# Patient Record
Sex: MALE | Race: OTHER | HISPANIC OR LATINO
[De-identification: names, ages, dates, MRNs, and addresses within clinical notes are randomized per-mention and may not be internally consistent; named-entity substitution may affect disease eponyms.]

---

## 2017-02-10 ENCOUNTER — APPOINTMENT (OUTPATIENT)
Dept: FAMILY MEDICINE | Facility: CLINIC | Age: 27
End: 2017-02-10

## 2017-02-10 VITALS
HEIGHT: 66 IN | OXYGEN SATURATION: 99 % | BODY MASS INDEX: 34.72 KG/M2 | TEMPERATURE: 98.7 F | HEART RATE: 70 BPM | SYSTOLIC BLOOD PRESSURE: 160 MMHG | WEIGHT: 216 LBS | DIASTOLIC BLOOD PRESSURE: 87 MMHG

## 2017-02-10 VITALS — SYSTOLIC BLOOD PRESSURE: 150 MMHG | DIASTOLIC BLOOD PRESSURE: 85 MMHG

## 2017-02-10 DIAGNOSIS — Z23 ENCOUNTER FOR IMMUNIZATION: ICD-10-CM

## 2017-02-12 LAB
25(OH)D3 SERPL-MCNC: 28 NG/ML
ALBUMIN SERPL ELPH-MCNC: 4.9 G/DL
ALP BLD-CCNC: 87 U/L
ALT SERPL-CCNC: 31 U/L
ANION GAP SERPL CALC-SCNC: 15 MMOL/L
APPEARANCE: CLEAR
AST SERPL-CCNC: 22 U/L
BACTERIA UR CULT: NORMAL
BACTERIA: NEGATIVE
BASOPHILS # BLD AUTO: 0.02 K/UL
BASOPHILS NFR BLD AUTO: 0.3 %
BILIRUB SERPL-MCNC: 0.3 MG/DL
BILIRUBIN URINE: NEGATIVE
BLOOD URINE: NEGATIVE
BUN SERPL-MCNC: 18 MG/DL
CALCIUM SERPL-MCNC: 9.8 MG/DL
CHLORIDE SERPL-SCNC: 100 MMOL/L
CHOLEST SERPL-MCNC: 164 MG/DL
CHOLEST/HDLC SERPL: 3.9 RATIO
CO2 SERPL-SCNC: 27 MMOL/L
COLOR: YELLOW
CREAT SERPL-MCNC: 1.16 MG/DL
EOSINOPHIL # BLD AUTO: 0.22 K/UL
EOSINOPHIL NFR BLD AUTO: 2.8 %
GLUCOSE QUALITATIVE U: NORMAL MG/DL
GLUCOSE SERPL-MCNC: 94 MG/DL
HBA1C MFR BLD HPLC: 6.1 %
HBV CORE IGG+IGM SER QL: NONREACTIVE
HBV SURFACE AB SER QL: REACTIVE
HBV SURFACE AG SER QL: NONREACTIVE
HCT VFR BLD CALC: 44.8 %
HCV AB SER QL: NONREACTIVE
HCV S/CO RATIO: 0.1 S/CO
HDLC SERPL-MCNC: 42 MG/DL
HGB BLD-MCNC: 14.8 G/DL
IMM GRANULOCYTES NFR BLD AUTO: 0.4 %
KETONES URINE: NEGATIVE
LDLC SERPL CALC-MCNC: 85 MG/DL
LEUKOCYTE ESTERASE URINE: NEGATIVE
LYMPHOCYTES # BLD AUTO: 2.01 K/UL
LYMPHOCYTES NFR BLD AUTO: 25.8 %
MAN DIFF?: NORMAL
MCHC RBC-ENTMCNC: 25.3 PG
MCHC RBC-ENTMCNC: 33 GM/DL
MCV RBC AUTO: 76.6 FL
MICROSCOPIC-UA: NORMAL
MONOCYTES # BLD AUTO: 0.55 K/UL
MONOCYTES NFR BLD AUTO: 7.1 %
NEUTROPHILS # BLD AUTO: 4.97 K/UL
NEUTROPHILS NFR BLD AUTO: 63.6 %
NITRITE URINE: NEGATIVE
PH URINE: 6.5
PLATELET # BLD AUTO: 176 K/UL
POTASSIUM SERPL-SCNC: 4.3 MMOL/L
PROT SERPL-MCNC: 7.6 G/DL
PROTEIN URINE: NEGATIVE MG/DL
PSA SERPL-MCNC: 1.04 NG/ML
RBC # BLD: 5.85 M/UL
RBC # FLD: 15.1 %
RED BLOOD CELLS URINE: 0 /HPF
SODIUM SERPL-SCNC: 142 MMOL/L
SPECIFIC GRAVITY URINE: 1.02
SQUAMOUS EPITHELIAL CELLS: 0 /HPF
T4 FREE SERPL-MCNC: 1.1 NG/DL
TRIGL SERPL-MCNC: 183 MG/DL
TSH SERPL-ACNC: 2.39 UIU/ML
UROBILINOGEN URINE: 1 MG/DL
WBC # FLD AUTO: 7.8 K/UL
WHITE BLOOD CELLS URINE: 0 /HPF

## 2017-03-24 ENCOUNTER — APPOINTMENT (OUTPATIENT)
Dept: FAMILY MEDICINE | Facility: CLINIC | Age: 27
End: 2017-03-24

## 2017-05-05 ENCOUNTER — APPOINTMENT (OUTPATIENT)
Dept: FAMILY MEDICINE | Facility: CLINIC | Age: 27
End: 2017-05-05

## 2018-02-09 ENCOUNTER — APPOINTMENT (OUTPATIENT)
Dept: FAMILY MEDICINE | Facility: CLINIC | Age: 28
End: 2018-02-09
Payer: COMMERCIAL

## 2018-02-09 VITALS
TEMPERATURE: 98.5 F | HEART RATE: 72 BPM | HEIGHT: 66 IN | WEIGHT: 200 LBS | BODY MASS INDEX: 32.14 KG/M2 | SYSTOLIC BLOOD PRESSURE: 154 MMHG | OXYGEN SATURATION: 97 % | DIASTOLIC BLOOD PRESSURE: 102 MMHG

## 2018-02-09 DIAGNOSIS — Z87.898 PERSONAL HISTORY OF OTHER SPECIFIED CONDITIONS: ICD-10-CM

## 2018-02-09 PROCEDURE — 99395 PREV VISIT EST AGE 18-39: CPT | Mod: 25

## 2018-02-09 PROCEDURE — 36415 COLL VENOUS BLD VENIPUNCTURE: CPT

## 2018-02-09 PROCEDURE — 93000 ELECTROCARDIOGRAM COMPLETE: CPT

## 2018-02-09 RX ORDER — AMLODIPINE BESYLATE 5 MG/1
5 TABLET ORAL DAILY
Qty: 30 | Refills: 2 | Status: DISCONTINUED | COMMUNITY
Start: 2017-02-10 | End: 2018-02-09

## 2018-02-10 LAB
25(OH)D3 SERPL-MCNC: 37.1 NG/ML
ALBUMIN SERPL ELPH-MCNC: 5.1 G/DL
ALP BLD-CCNC: 81 U/L
ALT SERPL-CCNC: 37 U/L
ANION GAP SERPL CALC-SCNC: 20 MMOL/L
APPEARANCE: CLEAR
AST SERPL-CCNC: 29 U/L
BACTERIA: NEGATIVE
BASOPHILS # BLD AUTO: 0.02 K/UL
BASOPHILS NFR BLD AUTO: 0.2 %
BILIRUB SERPL-MCNC: 0.6 MG/DL
BILIRUBIN URINE: NEGATIVE
BLOOD URINE: NEGATIVE
BUN SERPL-MCNC: 13 MG/DL
CALCIUM SERPL-MCNC: 10.1 MG/DL
CHLORIDE SERPL-SCNC: 99 MMOL/L
CHOLEST SERPL-MCNC: 161 MG/DL
CHOLEST/HDLC SERPL: 3.6 RATIO
CO2 SERPL-SCNC: 25 MMOL/L
COLOR: YELLOW
CREAT SERPL-MCNC: 1.28 MG/DL
EOSINOPHIL # BLD AUTO: 0.07 K/UL
EOSINOPHIL NFR BLD AUTO: 0.8 %
GLUCOSE QUALITATIVE U: NEGATIVE MG/DL
GLUCOSE SERPL-MCNC: 85 MG/DL
HBA1C MFR BLD HPLC: 5.8 %
HCT VFR BLD CALC: 47.7 %
HDLC SERPL-MCNC: 45 MG/DL
HGB BLD-MCNC: 15.4 G/DL
HYALINE CASTS: 12 /LPF
IMM GRANULOCYTES NFR BLD AUTO: 0.2 %
KETONES URINE: NEGATIVE
LDLC SERPL CALC-MCNC: 96 MG/DL
LEUKOCYTE ESTERASE URINE: NEGATIVE
LYMPHOCYTES # BLD AUTO: 2.16 K/UL
LYMPHOCYTES NFR BLD AUTO: 23.4 %
MAN DIFF?: NORMAL
MCHC RBC-ENTMCNC: 25 PG
MCHC RBC-ENTMCNC: 32.3 GM/DL
MCV RBC AUTO: 77.6 FL
MICROSCOPIC-UA: NORMAL
MONOCYTES # BLD AUTO: 0.7 K/UL
MONOCYTES NFR BLD AUTO: 7.6 %
NEUTROPHILS # BLD AUTO: 6.26 K/UL
NEUTROPHILS NFR BLD AUTO: 67.8 %
NITRITE URINE: NEGATIVE
PH URINE: 6
PLATELET # BLD AUTO: 243 K/UL
POTASSIUM SERPL-SCNC: 4.5 MMOL/L
PROT SERPL-MCNC: 7.7 G/DL
PROTEIN URINE: NEGATIVE MG/DL
RBC # BLD: 6.15 M/UL
RBC # FLD: 15.1 %
RED BLOOD CELLS URINE: 2 /HPF
SODIUM SERPL-SCNC: 144 MMOL/L
SPECIFIC GRAVITY URINE: 1.02
SQUAMOUS EPITHELIAL CELLS: 2 /HPF
T4 FREE SERPL-MCNC: 1.1 NG/DL
TRIGL SERPL-MCNC: 100 MG/DL
TSH SERPL-ACNC: 1.85 UIU/ML
UROBILINOGEN URINE: NEGATIVE MG/DL
WBC # FLD AUTO: 9.23 K/UL
WHITE BLOOD CELLS URINE: 2 /HPF

## 2018-03-23 ENCOUNTER — APPOINTMENT (OUTPATIENT)
Dept: FAMILY MEDICINE | Facility: CLINIC | Age: 28
End: 2018-03-23

## 2019-09-30 ENCOUNTER — NON-APPOINTMENT (OUTPATIENT)
Age: 29
End: 2019-09-30

## 2019-09-30 ENCOUNTER — APPOINTMENT (OUTPATIENT)
Dept: INTERNAL MEDICINE | Facility: CLINIC | Age: 29
End: 2019-09-30
Payer: COMMERCIAL

## 2019-09-30 VITALS
BODY MASS INDEX: 33.11 KG/M2 | TEMPERATURE: 98.8 F | HEART RATE: 92 BPM | SYSTOLIC BLOOD PRESSURE: 194 MMHG | HEIGHT: 66 IN | OXYGEN SATURATION: 96 % | DIASTOLIC BLOOD PRESSURE: 120 MMHG | WEIGHT: 206 LBS | RESPIRATION RATE: 14 BRPM

## 2019-09-30 VITALS — DIASTOLIC BLOOD PRESSURE: 110 MMHG | SYSTOLIC BLOOD PRESSURE: 180 MMHG

## 2019-09-30 DIAGNOSIS — I10 ESSENTIAL (PRIMARY) HYPERTENSION: ICD-10-CM

## 2019-09-30 DIAGNOSIS — Z78.9 OTHER SPECIFIED HEALTH STATUS: ICD-10-CM

## 2019-09-30 PROCEDURE — 99213 OFFICE O/P EST LOW 20 MIN: CPT | Mod: 25

## 2019-09-30 PROCEDURE — 93000 ELECTROCARDIOGRAM COMPLETE: CPT

## 2019-09-30 PROCEDURE — 99395 PREV VISIT EST AGE 18-39: CPT | Mod: 25

## 2019-09-30 PROCEDURE — 96127 BRIEF EMOTIONAL/BEHAV ASSMT: CPT

## 2019-09-30 PROCEDURE — 90674 CCIIV4 VAC NO PRSV 0.5 ML IM: CPT

## 2019-09-30 PROCEDURE — 36415 COLL VENOUS BLD VENIPUNCTURE: CPT

## 2019-09-30 PROCEDURE — 90471 IMMUNIZATION ADMIN: CPT

## 2019-09-30 RX ORDER — AMLODIPINE BESYLATE 5 MG/1
5 TABLET ORAL DAILY
Qty: 30 | Refills: 5 | Status: DISCONTINUED | COMMUNITY
Start: 2018-02-09 | End: 2019-09-30

## 2019-09-30 RX ORDER — BLOOD-GLUCOSE METER
KIT MISCELLANEOUS
Qty: 1 | Refills: 0 | Status: DISCONTINUED | COMMUNITY
Start: 2018-02-09 | End: 2019-09-30

## 2019-10-04 ENCOUNTER — MED ADMIN CHARGE (OUTPATIENT)
Age: 29
End: 2019-10-04

## 2019-10-04 NOTE — END OF VISIT
[FreeTextEntry3] : All medical entries made by the Scribe were at my, Dr. Jacinto Arevalo's, direction and personally dictated by me on [9/30/2019]. I have reviewed the chart and agree that the record accurately reflects my personal performance of the history, physical exam, assessment and plan. I have also personally directed, reviewed, and agreed with the chart.\par

## 2019-10-04 NOTE — HISTORY OF PRESENT ILLNESS
[FreeTextEntry1] : Annual physical [de-identified] : Patient is here for CPE. He was last seen in the office 2/2018. \par \par He presents today with c/o sporadic migraines/headaches that only present on Sundays x a couple of months. He states that at times migraines are so severe that he throws up and has to lay in bed. He notes that he works 6 days a week and has no sx during the work week. he states he only seems to get headaches Sunday when he is off from work.  He denies vision changes, arm or leg weakness.  No photophobia reported.  he states he does not have hx of headaches but states his father gets headaches. He is not taking anything for headaches.  he is currently asymptomatic\par \par He states he has not taken any BP medication in over a year.. He is asymptomatic and denies headaches(today) vision changes, SOB, chest pain, and palpations today.  \par \par He would like flu shot today and denies egg allergy. \par \par

## 2019-10-04 NOTE — PHYSICAL EXAM
[Well-Appearing] : well-appearing [No Acute Distress] : no acute distress [Normal Voice/Communication] : normal voice/communication [Normal Sclera/Conjunctiva] : normal sclera/conjunctiva [EOMI] : extraocular movements intact [PERRL] : pupils equal round and reactive to light [Normal Outer Ear/Nose] : the outer ears and nose were normal in appearance [Normal Oropharynx] : the oropharynx was normal [Normal TMs] : both tympanic membranes were normal [Normal Nasal Mucosa] : the nasal mucosa was normal [No Edema] : there was no peripheral edema [Normal] : normal rate, regular rhythm, normal S1 and S2 and no murmur heard [Soft] : abdomen soft [Non Tender] : non-tender [No Masses] : no abdominal mass palpated [Non-distended] : non-distended [No HSM] : no HSM [Normal Bowel Sounds] : normal bowel sounds [No Spinal Tenderness] : no spinal tenderness [No Joint Swelling] : no joint swelling [No Rash] : no rash [No Skin Lesions] : no skin lesions [Normal Affect] : the affect was normal [No Focal Deficits] : no focal deficits [Alert and Oriented x3] : oriented to person, place, and time [Normal Mood] : the mood was normal [Normal Insight/Judgement] : insight and judgment were intact [No Carotid Bruits] : no carotid bruits [No CVA Tenderness] : no CVA  tenderness [de-identified] : No calf tenderness [de-identified] : no abdominal bruits

## 2019-10-04 NOTE — ASSESSMENT
[FreeTextEntry1] : \par Chronic headaches/Migraines:\par -sporadic and only on Sundays- He is currently asymptomatic \par -he has not taken BP medication in over a year-uncontrolled HTN could be contributing\par -MRI of brain ordered\par -labs ordered \par -I advised him he can use OTC tylenol prn for headache\par -he is to notify office if sx persist or worsens\par -he may need to see neurology if sx persist\par \par Uncontrolled HTN:\par -EKG today showed NSR at 69, non specific T wave abnormalities-no significant change from previous EKG\par -risks of uncontrolled HTN discussed including heart attack, stroke, death, kidney disease\par -I adv low fat/low cholesterol diet and weight loss\par -I adv he  his caffeine intake\par -I advised low salt diet\par -Will start amlodipine 5mg PO daily\par -importance of f/u discussed with pt\par -If he develops severe  headaches, vision changes, chest pain and SOB he should go to ER\par -F/U 1-2 weeks for BP check\par -I send him prescription for BP machine-I advised he call me at the end of the week with BP readings. \par \par Hx of elevated liver enzymes:\par -hep B and C serologies were negative\par -will check labs today\par \par Obesity:\par -BMI 33\par -fasting labs ordered\par -weight loss encouraged\par \par Hypertriglyceridemia:\par -will check fasting labs\par \par Pre-DM:\par -Will check HgA1c\par \par Vitamin D insufficiency:\par -will check labs\par \par Abnormal EKG:\par -EKG today showed NSR at 69 - non specific T wave abnormalities \par -he is asymptomatic\par -he was refered to cardiologist in 2018 but he never went-I again advised and referred today\par \par HCM:\par \par CPE 2019 \par \par EKG 2019 \par \par Depression screening: negative (PHQ 2 score 0) 2018\par \par Flu shot: advised.  R/B discussed.  No egg allergy reported.  VIS given.  Flu shot given today 2019\par \par \par Tdap:  2016\par \par HIV testing offered: he declined testing 2019\par \par Hepatitis B: immune 2017\par \par F/U 1-2 weeks for BP check

## 2019-10-04 NOTE — HEALTH RISK ASSESSMENT
[Very Good] : ~his/her~  mood as very good [Yes] : Yes [No] : In the past 12 months have you used drugs other than those required for medical reasons? No [0] : 2) Feeling down, depressed, or hopeless: Not at all (0) [HIV test declined] : HIV test declined [Employed] : employed [] : No [de-identified] : rare [[id number]] : 0 [FreeTextEntry2] : [facility]

## 2019-10-04 NOTE — ADDENDUM
[FreeTextEntry1] : I, Rhea Carrion, acted solely as a scribe for Dr. Arevalo on this date [9/30/2019].\par

## 2019-10-05 LAB
25(OH)D3 SERPL-MCNC: 43.9 NG/ML
ALBUMIN SERPL ELPH-MCNC: 5.1 G/DL
ALP BLD-CCNC: 85 U/L
ALT SERPL-CCNC: 22 U/L
ANION GAP SERPL CALC-SCNC: 14 MMOL/L
APPEARANCE: CLEAR
AST SERPL-CCNC: 19 U/L
BACTERIA: NEGATIVE
BASOPHILS # BLD AUTO: 0.05 K/UL
BASOPHILS NFR BLD AUTO: 0.6 %
BILIRUB SERPL-MCNC: 0.3 MG/DL
BILIRUBIN URINE: NEGATIVE
BLOOD URINE: NEGATIVE
BUN SERPL-MCNC: 18 MG/DL
CALCIUM SERPL-MCNC: 9.6 MG/DL
CHLORIDE SERPL-SCNC: 104 MMOL/L
CHOLEST SERPL-MCNC: 156 MG/DL
CHOLEST/HDLC SERPL: 3.5 RATIO
CO2 SERPL-SCNC: 25 MMOL/L
COLOR: YELLOW
CREAT SERPL-MCNC: 1.41 MG/DL
CREAT SPEC-SCNC: 127 MG/DL
CREAT/PROT UR: 0.1 RATIO
EOSINOPHIL # BLD AUTO: 0.13 K/UL
EOSINOPHIL NFR BLD AUTO: 1.5 %
ERYTHROCYTE [SEDIMENTATION RATE] IN BLOOD BY WESTERGREN METHOD: 6 MM/HR
ESTIMATED AVERAGE GLUCOSE: 114 MG/DL
GLUCOSE QUALITATIVE U: NEGATIVE
GLUCOSE SERPL-MCNC: 111 MG/DL
HBA1C MFR BLD HPLC: 5.6 %
HCT VFR BLD CALC: 47.5 %
HDLC SERPL-MCNC: 45 MG/DL
HGB BLD-MCNC: 14.4 G/DL
HYALINE CASTS: 1 /LPF
IMM GRANULOCYTES NFR BLD AUTO: 0.4 %
KETONES URINE: NEGATIVE
LDLC SERPL CALC-MCNC: 82 MG/DL
LEUKOCYTE ESTERASE URINE: NEGATIVE
LYMPHOCYTES # BLD AUTO: 2.12 K/UL
LYMPHOCYTES NFR BLD AUTO: 24.9 %
MAN DIFF?: NORMAL
MCHC RBC-ENTMCNC: 24.5 PG
MCHC RBC-ENTMCNC: 30.3 GM/DL
MCV RBC AUTO: 80.9 FL
MICROSCOPIC-UA: NORMAL
MONOCYTES # BLD AUTO: 0.58 K/UL
MONOCYTES NFR BLD AUTO: 6.8 %
NEUTROPHILS # BLD AUTO: 5.62 K/UL
NEUTROPHILS NFR BLD AUTO: 65.8 %
NITRITE URINE: NEGATIVE
PH URINE: 6
PLATELET # BLD AUTO: 214 K/UL
POTASSIUM SERPL-SCNC: 4 MMOL/L
PROT SERPL-MCNC: 6.9 G/DL
PROT UR-MCNC: 14 MG/DL
PROTEIN URINE: NORMAL
RBC # BLD: 5.87 M/UL
RBC # FLD: 15.6 %
RED BLOOD CELLS URINE: 1 /HPF
SODIUM SERPL-SCNC: 143 MMOL/L
SPECIFIC GRAVITY URINE: 1.02
SQUAMOUS EPITHELIAL CELLS: 0 /HPF
T4 FREE SERPL-MCNC: 1.2 NG/DL
TRIGL SERPL-MCNC: 147 MG/DL
TSH SERPL-ACNC: 2.45 UIU/ML
UROBILINOGEN URINE: NORMAL
WBC # FLD AUTO: 8.53 K/UL
WHITE BLOOD CELLS URINE: 1 /HPF

## 2019-10-09 ENCOUNTER — APPOINTMENT (OUTPATIENT)
Dept: INTERNAL MEDICINE | Facility: CLINIC | Age: 29
End: 2019-10-09
Payer: COMMERCIAL

## 2019-10-09 VITALS
TEMPERATURE: 98.6 F | RESPIRATION RATE: 15 BRPM | OXYGEN SATURATION: 99 % | DIASTOLIC BLOOD PRESSURE: 90 MMHG | SYSTOLIC BLOOD PRESSURE: 152 MMHG | BODY MASS INDEX: 33.11 KG/M2 | WEIGHT: 206 LBS | HEART RATE: 90 BPM | HEIGHT: 66 IN

## 2019-10-09 VITALS — DIASTOLIC BLOOD PRESSURE: 112 MMHG | SYSTOLIC BLOOD PRESSURE: 160 MMHG

## 2019-10-09 DIAGNOSIS — R51 HEADACHE: ICD-10-CM

## 2019-10-09 DIAGNOSIS — R73.03 PREDIABETES.: ICD-10-CM

## 2019-10-09 DIAGNOSIS — R74.8 ABNORMAL LEVELS OF OTHER SERUM ENZYMES: ICD-10-CM

## 2019-10-09 PROCEDURE — 99214 OFFICE O/P EST MOD 30 MIN: CPT | Mod: 25

## 2019-10-09 PROCEDURE — 36415 COLL VENOUS BLD VENIPUNCTURE: CPT

## 2019-10-09 NOTE — PHYSICAL EXAM
[No Acute Distress] : no acute distress [Well-Appearing] : well-appearing [Normal Voice/Communication] : normal voice/communication [PERRL] : pupils equal round and reactive to light [Normal Sclera/Conjunctiva] : normal sclera/conjunctiva [Normal Oropharynx] : the oropharynx was normal [Normal] : normal rate, regular rhythm, normal S1 and S2 and no murmur heard [No Edema] : there was no peripheral edema [No Rash] : no rash [No Focal Deficits] : no focal deficits [Normal Affect] : the affect was normal [Normal Mood] : the mood was normal [Alert and Oriented x3] : oriented to person, place, and time [Normal Insight/Judgement] : insight and judgment were intact [de-identified] : DTR +2 UE and LE B/L except left patella tendon reflex 3+, %/% strength UE and LE B/L, normal finger to se testing B/L

## 2019-10-09 NOTE — REVIEW OF SYSTEMS
[Chills] : no chills [Fever] : no fever [Fatigue] : no fatigue [Pain] : no pain [Vision Problems] : no vision problems [Chest Pain] : no chest pain [Palpitations] : no palpitations [Shortness Of Breath] : no shortness of breath [Lower Ext Edema] : no lower extremity edema [Cough] : no cough [Wheezing] : no wheezing [Dyspnea on Exertion] : not dyspnea on exertion [Abdominal Pain] : no abdominal pain [Nausea] : no nausea [Diarrhea] : no diarrhea [Headache] : no headache [Vomiting] : no vomiting [Dizziness] : no dizziness [Unsteady Walk] : no ataxia [Negative] : Neurological

## 2019-10-09 NOTE — HISTORY OF PRESENT ILLNESS
[de-identified] : Here for follow up and BP check\par \par He states he made appt to see Neurology (Dr Mckeon)\par \par he states he has not had further headaches\par \par No new complaints

## 2019-10-09 NOTE — ASSESSMENT
[FreeTextEntry1] : \par Elevated creatinine 1.41\par -will check BMP\par -urine P:C negative 10/2019\par \par Chronic headaches/Migraines:\par -headaches have resolved\par \par Abnormal brain MRI:\par -his MRI brain shows multiple white matter lesions suggestive of multiple sclerosis\par -I discussed findings with him at length\par -appears to be asymptomatic\par -he has appt to see neurology this month\par \par Uncontrolled HTN:\par -I adv low fat/low cholesterol diet and weight loss\par -I adv he  his caffeine intake\par -I advised low salt diet\par -now on amlodipine 10mg PO daily and BP still not at goal\par -will add HCTZ 12.5mg Po daily\par -If he develops severe  headaches, vision changes, chest pain and SOB he should go to ER\par -F/U 3 weeks for BP check\par -he will continue to check BPs at home and was instructed to call me in 1 week with BP readings\par \par Abnormal EKG:\par -I again advised he see cardiology\par \par Obesity:\par -weight loss encouraged\par \par Hypertriglyceridemia:\par -lipids at goal 10/2019\par \par Vitamin D insufficiency:\par -vitamin D 25-OH was normal 10/2019\par \par \par HCM:\par \par CPE 2019 \par \par EKG 2019 \par \par Depression screening: negative (PHQ 2 score 0) 2018\par \par Flu shot:  2019\par \par Tdap:  2016\par \par HIV testing offered: he declined testing 2019\par \par Hepatitis B: immune 2017\par \par F/U 3 weeks for BP check

## 2019-10-10 LAB
ANION GAP SERPL CALC-SCNC: 18 MMOL/L
BUN SERPL-MCNC: 15 MG/DL
CALCIUM SERPL-MCNC: 10 MG/DL
CHLORIDE SERPL-SCNC: 103 MMOL/L
CO2 SERPL-SCNC: 21 MMOL/L
CREAT SERPL-MCNC: 1.28 MG/DL
GLUCOSE SERPL-MCNC: 106 MG/DL
POTASSIUM SERPL-SCNC: 3.9 MMOL/L
SODIUM SERPL-SCNC: 142 MMOL/L

## 2019-10-22 ENCOUNTER — APPOINTMENT (OUTPATIENT)
Dept: NEUROLOGY | Facility: CLINIC | Age: 29
End: 2019-10-22
Payer: COMMERCIAL

## 2019-10-22 VITALS
SYSTOLIC BLOOD PRESSURE: 170 MMHG | DIASTOLIC BLOOD PRESSURE: 108 MMHG | HEIGHT: 66 IN | BODY MASS INDEX: 33.11 KG/M2 | WEIGHT: 206 LBS

## 2019-10-22 PROCEDURE — 99204 OFFICE O/P NEW MOD 45 MIN: CPT

## 2019-10-22 NOTE — CONSULT LETTER
[Dear  ___] : Dear  [unfilled], [Consult Letter:] : I had the pleasure of evaluating your patient, [unfilled]. [Please see my note below.] : Please see my note below. [Consult Closing:] : Thank you very much for allowing me to participate in the care of this patient.  If you have any questions, please do not hesitate to contact me. [Sincerely,] : Sincerely, [FreeTextEntry3] : Dick Mckeon M.D., Ph.D. DPN-N\par [facility]\par [facility]\par Medical Director of Stroke Services\par [facility]\par

## 2019-10-22 NOTE — PHYSICAL EXAM
[General Appearance - Alert] : alert [General Appearance - In No Acute Distress] : in no acute distress [General Appearance - Well Developed] : well developed [General Appearance - Well Nourished] : well nourished [Place] : oriented to place [Person] : oriented to person [Time] : oriented to time [Short Term Intact] : short term memory intact [Span Intact] : the attention span was normal [Remote Intact] : remote memory intact [Registration Intact] : recent registration memory intact [Visual Intact] : visual attention was ~T not ~L decreased [Concentration Intact] : normal concentrating ability [Repeating Phrases] : no difficulty repeating a phrase [Naming Objects] : no difficulty naming common objects [Fluency] : fluency intact [Comprehension] : comprehension intact [Current Events] : adequate knowledge of current events [Past History] : adequate knowledge of personal past history [Cranial Nerves Oculomotor (III)] : extraocular motion intact [Cranial Nerves Optic (II)] : visual acuity intact bilaterally,  visual fields full to confrontation, pupils equal round and reactive to light [Cranial Nerves Facial (VII)] : face symmetrical [Cranial Nerves Trigeminal (V)] : facial sensation intact symmetrically [Cranial Nerves Vestibulocochlear (VIII)] : hearing was intact bilaterally [Cranial Nerves Hypoglossal (XII)] : there was no tongue deviation with protrusion [Cranial Nerves Accessory (XI - Cranial And Spinal)] : head turning and shoulder shrug symmetric [Cranial Nerves Glossopharyngeal (IX)] : tongue and palate midline [Motor Strength] : muscle strength was normal in all four extremities [No Muscle Atrophy] : normal bulk in all four extremities [Paresis Pronator Drift Right-Sided] : no pronator drift on the right [Paresis Pronator Drift Left-Sided] : no pronator drift on the left [Sensation Tactile Decrease] : light touch was intact [Sensation Pain / Temperature Decrease] : pain and temperature was intact [Sensation Vibration Decrease] : vibration was intact [Proprioception] : proprioception was intact [Balance] : balance was intact [Tremor] : no tremor present [Coordination - Dysmetria Impaired Finger-to-Nose Bilateral] : not present [2+] : Patella left 2+ [PERRL With Normal Accommodation] : pupils were equal in size, round, reactive to light, with normal accommodation [Sclera] : the sclera and conjunctiva were normal [Optic Disc Abnormality] : the optic disc were normal in size and color [No APD] : no afferent pupillary defect [Extraocular Movements] : extraocular movements were intact [Full Visual Field] : full visual field [No [place]] : no internuclear ophthalmoplegia [Papilledema Of Both Eyes] : no papilledema [Edema] : there was no peripheral edema [Disc Blurred Margins Both Eyes] : sharp margins [Abnormal Walk] : normal gait [Involuntary Movements] : no involuntary movements were seen [Motor Tone] : muscle strength and tone were normal

## 2019-10-22 NOTE — HISTORY OF PRESENT ILLNESS
[FreeTextEntry1] : Initial office visit October 22, 2019:\par This is a 29-year-old man who presents today for neurologic evaluation. He had an abnormal MRI of his brain. The MRI was done to evaluate headaches. The patient was experiencing headaches with nausea and vomiting. He was found to be hypertensive and given blood pressure medicines. When his blood pressure is better controlled the headaches resolved. However he had brain MRI done October 2, 2019 which showed white matter lesions. The radiologist suggested that the configuration of these lesions was consistent with multiple sclerosis. I don't have actual pictures of the MRI to review. He denies any previous neurologic symptoms including vision loss of one eye numbness or weakness of the limb or side of his body. He does not have any gait difficulties. He does endorse frequent urination but not incontinence. He is here today for neurologic evaluation.

## 2019-10-22 NOTE — DATA REVIEWED
[de-identified] : He brought a report of an MRI of his brain that was done October 2, 2019. The impression is multiple white matter lesions with the distribution and pattern compatible with classic demyelination and multiple sclerosis. Clinical correlation was advised.

## 2019-10-22 NOTE — ASSESSMENT
[FreeTextEntry1] : This is a 29-year-old man who presents today for evaluation and abnormal MRI that suggested the possibility of multiple sclerosis. He has no previous history of neurologic symptoms. Typically multiple sclerosis is thought to need 2 episodes are separate in time and space he is not had one. I would like to obtain an MRI of his cervical and thoracic spine as well as obtain pictures of the brain MRI for me to review. He may have a diagnosis of clinically definite multiple sclerosis based on radiology findings. If this is the case I will discuss treatment with him. I briefly touched upon different treatment strategies including injectable and oral medications for multiple sclerosis. I will call him with the results of the MRI of the cervical thoracic spine once available and I will see him back in the office and person in one month, sooner should the need arise.

## 2019-10-29 ENCOUNTER — APPOINTMENT (OUTPATIENT)
Dept: INTERNAL MEDICINE | Facility: CLINIC | Age: 29
End: 2019-10-29
Payer: COMMERCIAL

## 2019-11-18 ENCOUNTER — APPOINTMENT (OUTPATIENT)
Dept: INTERNAL MEDICINE | Facility: CLINIC | Age: 29
End: 2019-11-18
Payer: COMMERCIAL

## 2019-11-18 VITALS
DIASTOLIC BLOOD PRESSURE: 102 MMHG | OXYGEN SATURATION: 98 % | HEIGHT: 66 IN | HEART RATE: 104 BPM | SYSTOLIC BLOOD PRESSURE: 152 MMHG | RESPIRATION RATE: 15 BRPM | BODY MASS INDEX: 32.47 KG/M2 | TEMPERATURE: 98.9 F | WEIGHT: 202 LBS

## 2019-11-18 VITALS — SYSTOLIC BLOOD PRESSURE: 148 MMHG | DIASTOLIC BLOOD PRESSURE: 106 MMHG

## 2019-11-18 PROCEDURE — 99213 OFFICE O/P EST LOW 20 MIN: CPT

## 2019-11-18 NOTE — ASSESSMENT
[FreeTextEntry1] : \par Multiple sclerosis:\par -now seeing neurology-Dr Jean\par -he was referred to,ophthalmology\par -he will f/u with Neurologist to discuss medication options\par \par Uncontrolled HTN:\par -BP remains uncontrolled although home BPs seem to be better\par -I adv low fat/low cholesterol diet and weight loss\par -I adv he  his caffeine intake\par -I advised low salt diet\par -on amlodipine 10mg PO daily and HCTZ 12.5mg Po daily\par -will increase HCTZ to 25mg PO daily \par -If he develops severe  headaches, vision changes, chest pain and SOB he should go to ER\par -F/U 4 weeks for BP check\par -he will continue to check BPs at home and was instructed to call me in 1 week with BP readings-I advised him he may need third medication\par \par Abnormal EKG:\par -I have advised he see cardiology\par \par Obesity:\par -weight loss encouraged\par \par Hypertriglyceridemia:\par -lipids at goal 10/2019\par \par Vitamin D insufficiency:\par -vitamin D 25-OH was normal 10/2019\par \par \par HCM:\par \par CPE 2019 \par \par EKG 2019 \par \par Depression screening: negative (PHQ 2 score 0) 2018\par \par Flu shot:  2019\par \par Tdap:  2016\par \par HIV testing offered: he declined testing 2019\par \par Hepatitis B: immune 2017\par \par F/U 4 weeks for BP check

## 2019-11-18 NOTE — REVIEW OF SYSTEMS
[Negative] : Psychiatric [Fever] : no fever [Chills] : no chills [Fatigue] : no fatigue [Lower Ext Edema] : no lower extremity edema [Palpitations] : no palpitations [Chest Pain] : no chest pain [Shortness Of Breath] : no shortness of breath [Wheezing] : no wheezing [Dyspnea on Exertion] : not dyspnea on exertion [Cough] : no cough [Abdominal Pain] : no abdominal pain [Diarrhea] : no diarrhea [Nausea] : no nausea [Vomiting] : no vomiting

## 2019-11-18 NOTE — ADDENDUM
[FreeTextEntry1] : I, Rhea Carrion, acted solely as a scribe for Dr. Arevalo on this date [11/18/2019].\par

## 2019-11-18 NOTE — PHYSICAL EXAM
[Well-Appearing] : well-appearing [No Acute Distress] : no acute distress [Normal Voice/Communication] : normal voice/communication [Normal Sclera/Conjunctiva] : normal sclera/conjunctiva [PERRL] : pupils equal round and reactive to light [Normal Oropharynx] : the oropharynx was normal [Normal] : no respiratory distress, lungs were clear to auscultation bilaterally and no accessory muscle use [No Edema] : there was no peripheral edema [No Rash] : no rash [Normal Affect] : the affect was normal [Alert and Oriented x3] : oriented to person, place, and time [Normal Insight/Judgement] : insight and judgment were intact [Normal Mood] : the mood was normal

## 2019-11-18 NOTE — END OF VISIT
[FreeTextEntry3] : All medical entries made by the Scribe were at my, Dr. Jacinto Arevalo's, direction and personally dictated by me on [11/18/2019]. I have reviewed the chart and agree that the record accurately reflects my personal performance of the history, physical exam, assessment and plan. I have also personally directed, reviewed, and agreed with the chart.\par

## 2019-11-18 NOTE — HISTORY OF PRESENT ILLNESS
[FreeTextEntry1] : HTN follow up  [de-identified] : Here for follow up and BP check\par \par His BP is elevated today. He states that he has been compliant with medication. He also notes that his BP at home ranges from 134-140/90s. He does admit to eating [employer] here and there. \par \par He was seen by Neurologist-Dr Mckeon and dx with MS.  he also saw Dr Jean for second opion and was told he did have MS.  He was advised to see ophthalmologist and has referral.  He was told he would likely need to be started on MS medication.  He will f/u with neurology\par \par No new complaints

## 2019-11-20 ENCOUNTER — NON-APPOINTMENT (OUTPATIENT)
Age: 29
End: 2019-11-20

## 2019-11-20 ENCOUNTER — APPOINTMENT (OUTPATIENT)
Dept: CARDIOLOGY | Facility: CLINIC | Age: 29
End: 2019-11-20
Payer: COMMERCIAL

## 2019-11-20 VITALS
OXYGEN SATURATION: 98 % | WEIGHT: 203 LBS | HEART RATE: 94 BPM | HEIGHT: 66 IN | DIASTOLIC BLOOD PRESSURE: 96 MMHG | SYSTOLIC BLOOD PRESSURE: 157 MMHG | BODY MASS INDEX: 32.62 KG/M2

## 2019-11-20 VITALS — DIASTOLIC BLOOD PRESSURE: 91 MMHG | SYSTOLIC BLOOD PRESSURE: 150 MMHG

## 2019-11-20 DIAGNOSIS — Z78.9 OTHER SPECIFIED HEALTH STATUS: ICD-10-CM

## 2019-11-20 DIAGNOSIS — Z82.49 FAMILY HISTORY OF ISCHEMIC HEART DISEASE AND OTHER DISEASES OF THE CIRCULATORY SYSTEM: ICD-10-CM

## 2019-11-20 PROCEDURE — 99204 OFFICE O/P NEW MOD 45 MIN: CPT

## 2019-11-20 PROCEDURE — 93000 ELECTROCARDIOGRAM COMPLETE: CPT

## 2019-11-20 NOTE — HISTORY OF PRESENT ILLNESS
[FreeTextEntry1] : A 30 y/o male with PMH of HTN , Multiple sclerosis , referred because of abnormal eKG\par  He has no prior cardiac history . \par He has a physical job and has no exertional chest pain , SOB , palpitations , dizziness or syncope

## 2019-11-20 NOTE — ASSESSMENT
[FreeTextEntry1] : 28 y/o male with no significant PMHx, with HTN controlled on amlodipine , HCTZ \par Mildly uncontrolled\par Non specific ST-T wave changes \par will order an echo to evaluate for LV function, LVH and RWMA

## 2019-11-20 NOTE — PHYSICAL EXAM
[General Appearance - Well Developed] : well developed [Normal Appearance] : normal appearance [General Appearance - Well Nourished] : well nourished [Well Groomed] : well groomed [No Deformities] : no deformities [General Appearance - In No Acute Distress] : no acute distress [Normal Conjunctiva] : the conjunctiva exhibited no abnormalities [No Oral Pallor] : no oral pallor [Eyelids - No Xanthelasma] : the eyelids demonstrated no xanthelasmas [Normal Jugular Venous A Waves Present] : normal jugular venous A waves present [No Jugular Venous Diggs A Waves] : no jugular venous diggs A waves [Normal Jugular Venous V Waves Present] : normal jugular venous V waves present [No Oral Cyanosis] : no oral cyanosis [Heart Sounds] : normal S1 and S2 [Heart Rate And Rhythm] : heart rate and rhythm were normal [Murmurs] : no murmurs present [Respiration, Rhythm And Depth] : normal respiratory rhythm and effort [Exaggerated Use Of Accessory Muscles For Inspiration] : no accessory muscle use [Abdomen Tenderness] : non-tender [Auscultation Breath Sounds / Voice Sounds] : lungs were clear to auscultation bilaterally [Abdomen Soft] : soft [Abdomen Mass (___ Cm)] : no abdominal mass palpated [] : no hepato-splenomegaly [Cyanosis, Localized] : no localized cyanosis [Abnormal Walk] : normal gait [Nail Clubbing] : no clubbing of the fingernails [Skin Turgor] : normal skin turgor [Oriented To Time, Place, And Person] : oriented to person, place, and time [Skin Color & Pigmentation] : normal skin color and pigmentation

## 2019-11-25 ENCOUNTER — APPOINTMENT (OUTPATIENT)
Dept: NEUROLOGY | Facility: CLINIC | Age: 29
End: 2019-11-25

## 2019-11-26 ENCOUNTER — APPOINTMENT (OUTPATIENT)
Dept: CARDIOLOGY | Facility: CLINIC | Age: 29
End: 2019-11-26

## 2019-12-26 ENCOUNTER — APPOINTMENT (OUTPATIENT)
Dept: INTERNAL MEDICINE | Facility: CLINIC | Age: 29
End: 2019-12-26
Payer: COMMERCIAL

## 2019-12-26 VITALS
HEIGHT: 66 IN | BODY MASS INDEX: 32.95 KG/M2 | TEMPERATURE: 98.2 F | DIASTOLIC BLOOD PRESSURE: 83 MMHG | SYSTOLIC BLOOD PRESSURE: 145 MMHG | OXYGEN SATURATION: 99 % | HEART RATE: 95 BPM | WEIGHT: 205 LBS

## 2019-12-26 PROCEDURE — 36415 COLL VENOUS BLD VENIPUNCTURE: CPT

## 2019-12-26 PROCEDURE — 99213 OFFICE O/P EST LOW 20 MIN: CPT | Mod: 25

## 2019-12-26 PROCEDURE — 96127 BRIEF EMOTIONAL/BEHAV ASSMT: CPT

## 2019-12-26 NOTE — PHYSICAL EXAM
[Well-Appearing] : well-appearing [Normal Voice/Communication] : normal voice/communication [No Acute Distress] : no acute distress [Normal Oropharynx] : the oropharynx was normal [Normal Sclera/Conjunctiva] : normal sclera/conjunctiva [PERRL] : pupils equal round and reactive to light [No Edema] : there was no peripheral edema [Normal] : normal rate, regular rhythm, normal S1 and S2 and no murmur heard [Normal Affect] : the affect was normal [No Rash] : no rash [Normal Insight/Judgement] : insight and judgment were intact [Alert and Oriented x3] : oriented to person, place, and time [Normal Mood] : the mood was normal

## 2019-12-26 NOTE — ASSESSMENT
[FreeTextEntry1] : \par Multiple sclerosis:\par -now seeing neurology-Dr Jean\par -now on Tecfidera\par -he was seen by ophthalmology\par -he remains asymptomatic\par -will check BMP and hepatic function panel\par \par Uncontrolled HTN:\par -BP better but still not at goal\par -he states he is taking medication but feels he could do better with his diet\par -I adv low fat/low cholesterol diet and weight loss\par -I adv he  his caffeine intake\par -I advised low salt diet\par -on amlodipine 10mg PO daily and HCTZ 25mg Po daily\par -f/u in 3 months for BP check-if not at goal may need 3rd agent\par -will check BMP\par \par Abnormal EKG:\par -seen by cardiology who ordered ECHO-he missed appt for ECHO but states he will reschedule\par \par Obesity:\par -weight loss encouraged\par \par Hypertriglyceridemia:\par -lipids at goal 10/2019\par \par Vitamin D insufficiency:\par -vitamin D 25-OH was normal 10/2019\par \par \par HCM:\par \par CPE 2019 \par \par EKG 2019 \par \par Depression screening: negative (PHQ 2 score 0) 2019\par \par Flu shot:  2019\par \par Tdap:  2016\par \par HIV testing offered: he declined testing 2019\par \par Hepatitis B: immune 2017\par \par F/U 3 months for BP check

## 2019-12-26 NOTE — REVIEW OF SYSTEMS
[Negative] : Neurological [Fever] : no fever [Chills] : no chills [Fatigue] : no fatigue [Recent Change In Weight] : ~T no recent weight change [Chest Pain] : no chest pain [Palpitations] : no palpitations [Lower Ext Edema] : no lower extremity edema [Shortness Of Breath] : no shortness of breath [Wheezing] : no wheezing [Dyspnea on Exertion] : not dyspnea on exertion [Cough] : no cough [Nausea] : no nausea [Abdominal Pain] : no abdominal pain [Diarrhea] : no diarrhea [Vomiting] : no vomiting

## 2020-01-01 LAB
ALBUMIN SERPL ELPH-MCNC: 5.3 G/DL
ALP BLD-CCNC: 81 U/L
ALT SERPL-CCNC: 65 U/L
ANION GAP SERPL CALC-SCNC: 15 MMOL/L
AST SERPL-CCNC: 28 U/L
BILIRUB DIRECT SERPL-MCNC: 0.2 MG/DL
BILIRUB INDIRECT SERPL-MCNC: 0.4 MG/DL
BILIRUB SERPL-MCNC: 0.6 MG/DL
BUN SERPL-MCNC: 18 MG/DL
CALCIUM SERPL-MCNC: 10.2 MG/DL
CHLORIDE SERPL-SCNC: 99 MMOL/L
CO2 SERPL-SCNC: 27 MMOL/L
CREAT SERPL-MCNC: 1.28 MG/DL
GLUCOSE SERPL-MCNC: 87 MG/DL
POTASSIUM SERPL-SCNC: 3.7 MMOL/L
PROT SERPL-MCNC: 7.5 G/DL
SODIUM SERPL-SCNC: 141 MMOL/L

## 2020-01-02 ENCOUNTER — RESULT REVIEW (OUTPATIENT)
Age: 30
End: 2020-01-02

## 2020-05-08 ENCOUNTER — RX RENEWAL (OUTPATIENT)
Age: 30
End: 2020-05-08

## 2020-06-25 ENCOUNTER — APPOINTMENT (OUTPATIENT)
Dept: INTERNAL MEDICINE | Facility: CLINIC | Age: 30
End: 2020-06-25
Payer: COMMERCIAL

## 2020-06-25 VITALS
WEIGHT: 207 LBS | BODY MASS INDEX: 33.27 KG/M2 | HEIGHT: 66 IN | SYSTOLIC BLOOD PRESSURE: 140 MMHG | OXYGEN SATURATION: 98 % | RESPIRATION RATE: 15 BRPM | HEART RATE: 88 BPM | DIASTOLIC BLOOD PRESSURE: 94 MMHG | TEMPERATURE: 98.9 F

## 2020-06-25 DIAGNOSIS — I10 ESSENTIAL (PRIMARY) HYPERTENSION: ICD-10-CM

## 2020-06-25 PROCEDURE — 99213 OFFICE O/P EST LOW 20 MIN: CPT | Mod: 25

## 2020-06-25 PROCEDURE — 36415 COLL VENOUS BLD VENIPUNCTURE: CPT

## 2020-06-25 NOTE — PHYSICAL EXAM
[No Acute Distress] : no acute distress [Well-Appearing] : well-appearing [Normal Voice/Communication] : normal voice/communication [PERRL] : pupils equal round and reactive to light [Normal Sclera/Conjunctiva] : normal sclera/conjunctiva [Normal Oropharynx] : the oropharynx was normal [No Edema] : there was no peripheral edema [No Rash] : no rash [Alert and Oriented x3] : oriented to person, place, and time [Normal Affect] : the affect was normal [Normal Insight/Judgement] : insight and judgment were intact [Normal Mood] : the mood was normal [Normal] : no jugular venous distention, supple, no lymphadenopathy and the thyroid was normal and there were no nodules present [Non Tender] : non-tender [Non-distended] : non-distended [Soft] : abdomen soft [No Masses] : no abdominal mass palpated [No HSM] : no HSM [Normal Bowel Sounds] : normal bowel sounds [No Focal Deficits] : no focal deficits

## 2020-06-25 NOTE — ASSESSMENT
[FreeTextEntry1] : \par Multiple sclerosis:\par -now seeing neurology-Dr Jean\par - on Tecfidera\par -he remains asymptomatic\par \par HTN:\par -BP near goal\par -he states he is taking medication but feels he could do better with his diet\par -I adv low fat/low cholesterol diet and weight loss\par -I adv he  his caffeine intake\par -I advised low salt diet\par -on amlodipine 10mg PO daily and HCTZ 25mg Po daily\par -f/u in 3-4 months for BP check\par -will check labs\par \par Abnormal EKG:\par -seen by cardiology who ordered ECHO-he missed appt for ECHO but states he will reschedule-I again advised he f/u with his cardiologist\par \par Obesity:\par -BMI 33\par -risks of obesity discussed\par -benefits of weight loss discussed\par -low fat/low chol diet and low carbohydrate diet advised\par -small portion sizes encouraged\par -I advised avoidance of sugary drinks\par -weight loss advised\par \par Hypertriglyceridemia:\par -lipids at goal 10/2019\par \par Vitamin D insufficiency:\par -vitamin D 25-OH was normal 10/2019\par \par Elevated LFTS:\par -will check hepatic function panel and will check hepatitis B and c serologies\par \par \par HCM:\par \par CPE 2019 \par \par EKG 2019 \par \par Depression screening: negative (PHQ 2 score 0) 2019\par \par Flu shot:  2019\par \par Tdap:  2016\par \par HIV testing offered: he declined testing 2019\par \par Hepatitis B: immune 2017\par \par Covid antibody test offered: he consented to testing\par \par F/U 3-4 months for BP check.  Labs drawn in office today

## 2020-06-25 NOTE — REVIEW OF SYSTEMS
[Negative] : Neurological [Fever] : no fever [Chills] : no chills [Fatigue] : no fatigue [Chest Pain] : no chest pain [Palpitations] : no palpitations [Lower Ext Edema] : no lower extremity edema [Wheezing] : no wheezing [Shortness Of Breath] : no shortness of breath [Cough] : no cough [Abdominal Pain] : no abdominal pain [Dyspnea on Exertion] : not dyspnea on exertion [Nausea] : no nausea [Diarrhea] : no diarrhea [Vomiting] : no vomiting [Headache] : no headache

## 2020-06-26 LAB
ALBUMIN SERPL ELPH-MCNC: 5.4 G/DL
ALP BLD-CCNC: 71 U/L
ALT SERPL-CCNC: 30 U/L
ANION GAP SERPL CALC-SCNC: 18 MMOL/L
AST SERPL-CCNC: 21 U/L
BASOPHILS # BLD AUTO: 0.03 K/UL
BASOPHILS NFR BLD AUTO: 0.5 %
BILIRUB DIRECT SERPL-MCNC: 0.1 MG/DL
BILIRUB INDIRECT SERPL-MCNC: 0.3 MG/DL
BILIRUB SERPL-MCNC: 0.4 MG/DL
BUN SERPL-MCNC: 21 MG/DL
CALCIUM SERPL-MCNC: 10 MG/DL
CHLORIDE SERPL-SCNC: 101 MMOL/L
CO2 SERPL-SCNC: 23 MMOL/L
CREAT SERPL-MCNC: 1.24 MG/DL
EOSINOPHIL # BLD AUTO: 0.13 K/UL
EOSINOPHIL NFR BLD AUTO: 2 %
GLUCOSE SERPL-MCNC: 94 MG/DL
HBV CORE IGG+IGM SER QL: NONREACTIVE
HBV SURFACE AB SER QL: REACTIVE
HBV SURFACE AG SER QL: NONREACTIVE
HCT VFR BLD CALC: 46.2 %
HCV AB SER QL: NONREACTIVE
HCV S/CO RATIO: 0.08 S/CO
HGB BLD-MCNC: 14.1 G/DL
IMM GRANULOCYTES NFR BLD AUTO: 0.2 %
LYMPHOCYTES # BLD AUTO: 1.91 K/UL
LYMPHOCYTES NFR BLD AUTO: 30 %
MAN DIFF?: NORMAL
MCHC RBC-ENTMCNC: 24.3 PG
MCHC RBC-ENTMCNC: 30.5 GM/DL
MCV RBC AUTO: 79.7 FL
MONOCYTES # BLD AUTO: 0.6 K/UL
MONOCYTES NFR BLD AUTO: 9.4 %
NEUTROPHILS # BLD AUTO: 3.68 K/UL
NEUTROPHILS NFR BLD AUTO: 57.9 %
PLATELET # BLD AUTO: 230 K/UL
POTASSIUM SERPL-SCNC: 4 MMOL/L
PROT SERPL-MCNC: 7.2 G/DL
RBC # BLD: 5.8 M/UL
RBC # FLD: 15.6 %
SARS-COV-2 IGG SERPL IA-ACNC: 0.01 INDEX
SARS-COV-2 IGG SERPL QL IA: NEGATIVE
SODIUM SERPL-SCNC: 143 MMOL/L
WBC # FLD AUTO: 6.36 K/UL

## 2020-07-12 ENCOUNTER — RX RENEWAL (OUTPATIENT)
Age: 30
End: 2020-07-12

## 2020-10-19 ENCOUNTER — APPOINTMENT (OUTPATIENT)
Dept: INTERNAL MEDICINE | Facility: CLINIC | Age: 30
End: 2020-10-19
Payer: COMMERCIAL

## 2020-10-19 VITALS
HEIGHT: 66 IN | SYSTOLIC BLOOD PRESSURE: 132 MMHG | BODY MASS INDEX: 34.55 KG/M2 | HEART RATE: 77 BPM | WEIGHT: 215 LBS | OXYGEN SATURATION: 99 % | DIASTOLIC BLOOD PRESSURE: 90 MMHG | TEMPERATURE: 99 F | RESPIRATION RATE: 15 BRPM

## 2020-10-19 DIAGNOSIS — R94.31 ABNORMAL ELECTROCARDIOGRAM [ECG] [EKG]: ICD-10-CM

## 2020-10-19 PROCEDURE — G0008: CPT

## 2020-10-19 PROCEDURE — 99072 ADDL SUPL MATRL&STAF TM PHE: CPT

## 2020-10-19 PROCEDURE — 90686 IIV4 VACC NO PRSV 0.5 ML IM: CPT

## 2020-10-19 PROCEDURE — 99213 OFFICE O/P EST LOW 20 MIN: CPT | Mod: 25

## 2020-10-19 NOTE — ASSESSMENT
[FreeTextEntry1] : \par Multiple sclerosis:\par -followed by neurology-Dr Jean\par - on Tecfidera\par -he remains asymptomatic\par \par HTN:\par -BP mildly elevated but he has gained weight\par -he states he plan on going back to gym and starting to exercise\par -on amlodipine 10mg PO daily and HCTZ 25mg Po daily\par -I advised low fat/low cholesterol diet, low salt diet, and weight loss\par \par Abnormal EKG:\par -seen by cardiology who ordered ECHO-he missed appt for ECHO but states he will reschedule-I again advised he f/u with his cardiologist\par \par Obesity:\par -he has gained 8 lbs\par -BMI 34\par -weight loss advised\par \par Hypertriglyceridemia:\par -lipids at goal 10/2019\par -will check lipids\par \par Vitamin D insufficiency:\par -will check vitamin D 25-OH\par \par Elevated LFTS:\par -Hepatitis B and C serologies were negative\par -will check lab\par \par \par HCM:\par \par CPE 9/30/2019 \par \par EKG 9/30/2019 \par \par Depression screening: negative (PHQ 2 score 0) 12/26/2019\par \par Flu shot:  advised.  R/B discussed.  No egg allergy reported.  VIS given.  Flu shot given today 10/19/2020\par \par Tdap:  5/31/2016\par \par HIV testing offered: he declined testing 9/30/2019\par \par Hepatitis B: immune 6/2020\par \par Covid antibody test: 6/2020 negative\par \par F/U 3 months for BP check.  Labs ordered-he will have done in office

## 2020-10-19 NOTE — REVIEW OF SYSTEMS
[Negative] : ENT [Fever] : no fever [Chills] : no chills [Chest Pain] : no chest pain [Fatigue] : no fatigue [Shortness Of Breath] : no shortness of breath [Lower Ext Edema] : no lower extremity edema [Palpitations] : no palpitations [Wheezing] : no wheezing [Cough] : no cough [Abdominal Pain] : no abdominal pain [Dyspnea on Exertion] : not dyspnea on exertion [Nausea] : no nausea [Diarrhea] : no diarrhea [FreeTextEntry2] : 8 lb weight gain [Vomiting] : no vomiting

## 2020-10-19 NOTE — HISTORY OF PRESENT ILLNESS
[de-identified] : Here for BP check\par \par He states BPs at home 117-125/88-90s at home\par \par He states he feels well\par \par He would like flu shot and denies egg allergy

## 2020-10-19 NOTE — PHYSICAL EXAM
[Well-Appearing] : well-appearing [No Acute Distress] : no acute distress [Normal Voice/Communication] : normal voice/communication [Normal Sclera/Conjunctiva] : normal sclera/conjunctiva [PERRL] : pupils equal round and reactive to light [Normal Oropharynx] : the oropharynx was normal [Normal] : normal rate, regular rhythm, normal S1 and S2 and no murmur heard [No Edema] : there was no peripheral edema [Soft] : abdomen soft [Non Tender] : non-tender [Non-distended] : non-distended [No HSM] : no HSM [Normal Bowel Sounds] : normal bowel sounds [No Masses] : no abdominal mass palpated [No Rash] : no rash [Normal Affect] : the affect was normal [No Focal Deficits] : no focal deficits [Normal Insight/Judgement] : insight and judgment were intact [Normal Mood] : the mood was normal [Alert and Oriented x3] : oriented to person, place, and time

## 2020-11-23 ENCOUNTER — NON-APPOINTMENT (OUTPATIENT)
Age: 30
End: 2020-11-23

## 2021-01-19 ENCOUNTER — NON-APPOINTMENT (OUTPATIENT)
Age: 31
End: 2021-01-19

## 2021-01-19 ENCOUNTER — APPOINTMENT (OUTPATIENT)
Dept: INTERNAL MEDICINE | Facility: CLINIC | Age: 31
End: 2021-01-19
Payer: COMMERCIAL

## 2021-01-19 VITALS
WEIGHT: 215 LBS | SYSTOLIC BLOOD PRESSURE: 142 MMHG | HEART RATE: 73 BPM | TEMPERATURE: 98.9 F | BODY MASS INDEX: 34.55 KG/M2 | DIASTOLIC BLOOD PRESSURE: 98 MMHG | OXYGEN SATURATION: 98 % | RESPIRATION RATE: 15 BRPM | HEIGHT: 66 IN

## 2021-01-19 DIAGNOSIS — Z13.31 ENCOUNTER FOR SCREENING FOR DEPRESSION: ICD-10-CM

## 2021-01-19 PROCEDURE — 99395 PREV VISIT EST AGE 18-39: CPT | Mod: 25

## 2021-01-19 PROCEDURE — 93000 ELECTROCARDIOGRAM COMPLETE: CPT

## 2021-01-19 PROCEDURE — 96127 BRIEF EMOTIONAL/BEHAV ASSMT: CPT

## 2021-01-19 PROCEDURE — 99072 ADDL SUPL MATRL&STAF TM PHE: CPT

## 2021-01-19 PROCEDURE — 36415 COLL VENOUS BLD VENIPUNCTURE: CPT

## 2021-01-19 RX ORDER — BLOOD-GLUCOSE METER
KIT MISCELLANEOUS
Qty: 1 | Refills: 0 | Status: DISCONTINUED | COMMUNITY
Start: 2019-09-30 | End: 2021-01-19

## 2021-01-19 RX ORDER — DIMETHYL FUMARATE 240 MG/1
240 CAPSULE ORAL
Refills: 0 | Status: DISCONTINUED | COMMUNITY
Start: 2019-12-26 | End: 2021-01-19

## 2021-01-19 RX ORDER — DIROXIMEL FUMARATE 231 MG/1
231 CAPSULE ORAL
Refills: 0 | Status: ACTIVE | COMMUNITY
Start: 2021-01-19

## 2021-01-19 NOTE — PHYSICAL EXAM
[Well-Appearing] : well-appearing [Normal Voice/Communication] : normal voice/communication [Normal Sclera/Conjunctiva] : normal sclera/conjunctiva [PERRL] : pupils equal round and reactive to light [Normal Oropharynx] : the oropharynx was normal [Normal] : normal rate, regular rhythm, normal S1 and S2 and no murmur heard [No Edema] : there was no peripheral edema [Soft] : abdomen soft [Non Tender] : non-tender [Non-distended] : non-distended [No Masses] : no abdominal mass palpated [No HSM] : no HSM [Normal Bowel Sounds] : normal bowel sounds [No Rash] : no rash [No Focal Deficits] : no focal deficits [Normal Affect] : the affect was normal [Alert and Oriented x3] : oriented to person, place, and time [Normal Mood] : the mood was normal [Normal Insight/Judgement] : insight and judgment were intact [No Acute Distress] : no acute distress [Well Developed] : well developed [EOMI] : extraocular movements intact [Normal Outer Ear/Nose] : the outer ears and nose were normal in appearance [Normal TMs] : both tympanic membranes were normal [Normal Nasal Mucosa] : the nasal mucosa was normal [No Carotid Bruits] : no carotid bruits [No Spinal Tenderness] : no spinal tenderness [No Skin Lesions] : no skin lesions [de-identified] : no calf tenderness

## 2021-01-19 NOTE — HEALTH RISK ASSESSMENT
[Yes] : Yes [No] : In the past 12 months have you used drugs other than those required for medical reasons? No [0] : 2) Feeling down, depressed, or hopeless: Not at all (0) [HIV test declined] : HIV test declined [Employed] : employed [] : No [de-identified] : occasionally [[id number]] : 0 [FreeTextEntry2] : [facility]

## 2021-01-19 NOTE — ASSESSMENT
[FreeTextEntry1] : \par Multiple sclerosis:\par -followed by neurology-Dr Jean\par - on Vumerity now\par -he remains asymptomatic\par \par HTN:\par -BP elevated today\par -on amlodipine 10mg PO daily and HCTZ 25mg Po daily\par -will add losartan 25mg daily  (R/B/A/side effects discussed)\par -I advised low fat/low cholesterol diet, low salt diet, and weight loss\par \par Abnormal EKG:\par -EKG today  NSR at 72, no ST abnormalities\par -seen by cardiology who ordered ECHO-he missed appt for ECHO but states he will reschedule-I again advised he f/u with his cardiologist\par \par Obesity:\par -BMI 34\par -risks of obesity discussed\par -benefits of weight loss discussed\par -low fat/low chol diet and low carbohydrate diet advised\par -small portion sizes encouraged\par -I advised avoidance of sugary drinks\par -aerobic exercise encouraged\par -weight loss advised\par \par Hypertriglyceridemia:\par -lipids at goal 11/2020\par \par Vitamin D insufficiency:\par -vitamin D 25-OH was normal 11/2020\par \par Elevated LFTS:\par -Hepatitis B and C serologies were negative\par -LFTs normal 11/2020\par \par \par HCM:\par \par CPE 1/19/2021\par \par EKG 1/19/2021\par \par Depression screening: negative (PHQ 2 score 0) 1/19/2021\par \par Flu shot:  10/19/2020\par \par Tdap:  5/31/2016\par \par HIV testing offered: he declined testing 91/19/2021\par \par Hepatitis B: immune 6/2020\par \par Covid antibody test: 6/2020 negative\par \par F/U 6 weeks for BP check.  Labs ordered and drawn in office today

## 2021-01-19 NOTE — REVIEW OF SYSTEMS
[Negative] : Musculoskeletal [Fever] : no fever [Chills] : no chills [Fatigue] : no fatigue [Recent Change In Weight] : ~T no recent weight change [Chest Pain] : no chest pain [Palpitations] : no palpitations [Lower Ext Edema] : no lower extremity edema [Shortness Of Breath] : no shortness of breath [Wheezing] : no wheezing [Cough] : no cough [Dyspnea on Exertion] : not dyspnea on exertion [Abdominal Pain] : no abdominal pain [Nausea] : no nausea [Diarrhea] : no diarrhea [Vomiting] : no vomiting [Anxiety] : no anxiety [Depression] : no depression

## 2021-01-19 NOTE — HISTORY OF PRESENT ILLNESS
[de-identified] : Here for CPE\par \par He states he feels well and denies any new complaints\par \par He states he ate [employer] and fries last night

## 2021-01-23 LAB
ALBUMIN SERPL ELPH-MCNC: 5.3 G/DL
ALP BLD-CCNC: 92 U/L
ALT SERPL-CCNC: 38 U/L
ANION GAP SERPL CALC-SCNC: 14 MMOL/L
AST SERPL-CCNC: 24 U/L
BASOPHILS # BLD AUTO: 0.04 K/UL
BASOPHILS NFR BLD AUTO: 0.6 %
BILIRUB SERPL-MCNC: 0.4 MG/DL
BUN SERPL-MCNC: 21 MG/DL
CALCIUM SERPL-MCNC: 10.4 MG/DL
CHLORIDE SERPL-SCNC: 100 MMOL/L
CO2 SERPL-SCNC: 28 MMOL/L
CREAT SERPL-MCNC: 1.35 MG/DL
EOSINOPHIL # BLD AUTO: 0.13 K/UL
EOSINOPHIL NFR BLD AUTO: 1.8 %
ESTIMATED AVERAGE GLUCOSE: 128 MG/DL
GLUCOSE SERPL-MCNC: 100 MG/DL
HBA1C MFR BLD HPLC: 6.1 %
HCT VFR BLD CALC: 48.1 %
HGB BLD-MCNC: 14.6 G/DL
IMM GRANULOCYTES NFR BLD AUTO: 0.3 %
LYMPHOCYTES # BLD AUTO: 1.83 K/UL
LYMPHOCYTES NFR BLD AUTO: 25.6 %
MAN DIFF?: NORMAL
MCHC RBC-ENTMCNC: 24.1 PG
MCHC RBC-ENTMCNC: 30.4 GM/DL
MCV RBC AUTO: 79.4 FL
MONOCYTES # BLD AUTO: 0.73 K/UL
MONOCYTES NFR BLD AUTO: 10.2 %
NEUTROPHILS # BLD AUTO: 4.4 K/UL
NEUTROPHILS NFR BLD AUTO: 61.5 %
PLATELET # BLD AUTO: 255 K/UL
POTASSIUM SERPL-SCNC: 3.9 MMOL/L
PROT SERPL-MCNC: 7.5 G/DL
RBC # BLD: 6.06 M/UL
RBC # FLD: 15.9 %
SODIUM SERPL-SCNC: 142 MMOL/L
T4 FREE SERPL-MCNC: 1.4 NG/DL
TSH SERPL-ACNC: 2.72 UIU/ML
WBC # FLD AUTO: 7.15 K/UL

## 2021-01-25 ENCOUNTER — NON-APPOINTMENT (OUTPATIENT)
Age: 31
End: 2021-01-25

## 2021-02-05 ENCOUNTER — RX RENEWAL (OUTPATIENT)
Age: 31
End: 2021-02-05

## 2021-02-10 ENCOUNTER — APPOINTMENT (OUTPATIENT)
Dept: CARDIOLOGY | Facility: CLINIC | Age: 31
End: 2021-02-10

## 2021-03-02 ENCOUNTER — APPOINTMENT (OUTPATIENT)
Dept: INTERNAL MEDICINE | Facility: CLINIC | Age: 31
End: 2021-03-02

## 2021-04-19 ENCOUNTER — RX RENEWAL (OUTPATIENT)
Age: 31
End: 2021-04-19

## 2021-04-23 ENCOUNTER — APPOINTMENT (OUTPATIENT)
Dept: INTERNAL MEDICINE | Facility: CLINIC | Age: 31
End: 2021-04-23
Payer: COMMERCIAL

## 2021-04-23 VITALS
RESPIRATION RATE: 16 BRPM | SYSTOLIC BLOOD PRESSURE: 130 MMHG | HEIGHT: 66 IN | OXYGEN SATURATION: 99 % | BODY MASS INDEX: 36 KG/M2 | DIASTOLIC BLOOD PRESSURE: 80 MMHG | HEART RATE: 79 BPM | WEIGHT: 224 LBS | TEMPERATURE: 98.4 F

## 2021-04-23 PROCEDURE — 36415 COLL VENOUS BLD VENIPUNCTURE: CPT

## 2021-04-23 PROCEDURE — 99072 ADDL SUPL MATRL&STAF TM PHE: CPT

## 2021-04-23 PROCEDURE — 99213 OFFICE O/P EST LOW 20 MIN: CPT | Mod: 25

## 2021-04-23 NOTE — REVIEW OF SYSTEMS
80- Attended vaginal birth of viable male infant. Tactile stimulation and bulb suction utilized. No other interventions required at this time. Apgars assigned 8/9.     2241- Vital signs taken without complications. Assessment performed at this time. Infant in no apparent distress or discomfort. Infant medications given at this time. 2311- Vital signs taken without complications. 2320- Bedside and Verbal shift change report given to Jhon Pollock RN (oncoming nurse) by Gonzalo Hamlin RN (offgoing nurse). Report included the following information SBAR, Intake/Output, MAR and Recent Results. Leaving L&D at this time to work on postpartum. 0200- TRANSFER - IN REPORT:    Verbal report received from Jhon Pollock RN(name) on [place] [id number]  being received from L&D(unit) for routine progression of care      Report consisted of patients Situation, Background, Assessment and   Recommendations(SBAR). Information from the following report(s) SBAR, Intake/Output, MAR and Recent Results was reviewed with the receiving nurse. 0720- Bedside and Verbal shift change report given to Deidra Miranda RN (oncoming nurse) by Gonzalo Hamlin RN (offgoing nurse). Report included the following information SBAR, Intake/Output, MAR and Recent Results. [Fever] : no fever [Chills] : no chills [Fatigue] : no fatigue [Chest Pain] : no chest pain [Palpitations] : no palpitations [Lower Ext Edema] : no lower extremity edema [Shortness Of Breath] : no shortness of breath [Wheezing] : no wheezing [Cough] : no cough [Dyspnea on Exertion] : not dyspnea on exertion [Abdominal Pain] : no abdominal pain [Nausea] : no nausea [Diarrhea] : no diarrhea [Vomiting] : no vomiting [Negative] : Psychiatric [FreeTextEntry2] : 9 lb weight gain

## 2021-04-23 NOTE — HISTORY OF PRESENT ILLNESS
[de-identified] : Here for follow up and BP check\par \par He states he feels well\par \par He was supposed to repeat BMP but states he forgot

## 2021-04-23 NOTE — PHYSICAL EXAM
[No Acute Distress] : no acute distress [Well Developed] : well developed [Well-Appearing] : well-appearing [Normal Voice/Communication] : normal voice/communication [Normal Sclera/Conjunctiva] : normal sclera/conjunctiva [PERRL] : pupils equal round and reactive to light [Normal Oropharynx] : the oropharynx was normal [No JVD] : no jugular venous distention [Normal] : normal rate, regular rhythm, normal S1 and S2 and no murmur heard [No Edema] : there was no peripheral edema [No Rash] : no rash [Normal Affect] : the affect was normal [Alert and Oriented x3] : oriented to person, place, and time [Normal Mood] : the mood was normal [Normal Insight/Judgement] : insight and judgment were intact [de-identified] : Obese

## 2021-04-23 NOTE — ASSESSMENT
[FreeTextEntry1] : \par Multiple sclerosis:\par -followed by neurology-Dr Jean\par - on Vumerity \par -he remains asymptomatic\par \par HTN:\par -BP at goal\par -on amlodipine 10mg PO daily, HCTZ 25mg Po daily and losartan 25mg daily  \par -I advised low fat/low cholesterol diet, low salt diet, and weight loss\par -check BMP and UA\par \par Last creatinine 1/35\par -check BMP\par \par Abnormal EKG:\par -EKG today  NSR at 72, no ST abnormalities\par -I have advised f/u with cardiology to complete work up previously\par \par Obesity:\par -he has gained 9 labs\par -risks of obesity discussed\par -benefits of weight loss discussed\par -low fat/low chol diet and low carbohydrate diet advised\par -small portion sizes encouraged\par -I advised avoidance of sugary drinks\par -weight loss advised\par -I adv he avoid fast food\par \par Hypertriglyceridemia:\par -lipids at goal 11/2020\par \par Vitamin D insufficiency:\par -vitamin D 25-OH was normal 11/2020\par \par Elevated LFTS:\par -Hepatitis B and C serologies were negative\par -LFTs normal 1/2021\par \par \par HCM:\par \par CPE 1/19/2021\par \par EKG 1/19/2021\par \par Depression screening: negative (PHQ 2 score 0) 1/19/2021\par \par Flu shot:  10/19/2020\par \par Tdap:  5/31/2016\par \par Covid vaccine (Pfizer) 3/26/2021 and 4/17/2021\par \par HIV testing offered: he declined testing 91/19/2021\par \par Hepatitis B: immune 6/2020\par \par Covid antibody test: 6/2020 negative\par \par F/U 3 months for BP check.  Labs ordered and drawn in office today

## 2021-05-06 ENCOUNTER — RX RENEWAL (OUTPATIENT)
Age: 31
End: 2021-05-06

## 2021-07-20 ENCOUNTER — APPOINTMENT (OUTPATIENT)
Dept: INTERNAL MEDICINE | Facility: CLINIC | Age: 31
End: 2021-07-20

## 2021-08-01 ENCOUNTER — RX RENEWAL (OUTPATIENT)
Age: 31
End: 2021-08-01

## 2021-08-28 ENCOUNTER — RX RENEWAL (OUTPATIENT)
Age: 31
End: 2021-08-28

## 2022-01-15 ENCOUNTER — RX RENEWAL (OUTPATIENT)
Age: 32
End: 2022-01-15

## 2022-03-21 ENCOUNTER — RX RENEWAL (OUTPATIENT)
Age: 32
End: 2022-03-21

## 2022-06-17 ENCOUNTER — APPOINTMENT (OUTPATIENT)
Dept: INTERNAL MEDICINE | Facility: CLINIC | Age: 32
End: 2022-06-17
Payer: COMMERCIAL

## 2022-06-17 ENCOUNTER — NON-APPOINTMENT (OUTPATIENT)
Age: 32
End: 2022-06-17

## 2022-06-17 VITALS
HEART RATE: 87 BPM | DIASTOLIC BLOOD PRESSURE: 98 MMHG | BODY MASS INDEX: 33.43 KG/M2 | RESPIRATION RATE: 16 BRPM | WEIGHT: 208 LBS | HEIGHT: 66 IN | SYSTOLIC BLOOD PRESSURE: 140 MMHG | OXYGEN SATURATION: 99 % | TEMPERATURE: 97.9 F

## 2022-06-17 DIAGNOSIS — R79.89 OTHER SPECIFIED ABNORMAL FINDINGS OF BLOOD CHEMISTRY: ICD-10-CM

## 2022-06-17 DIAGNOSIS — E55.9 VITAMIN D DEFICIENCY, UNSPECIFIED: ICD-10-CM

## 2022-06-17 DIAGNOSIS — E78.1 PURE HYPERGLYCERIDEMIA: ICD-10-CM

## 2022-06-17 PROCEDURE — 36415 COLL VENOUS BLD VENIPUNCTURE: CPT

## 2022-06-17 PROCEDURE — 96127 BRIEF EMOTIONAL/BEHAV ASSMT: CPT

## 2022-06-17 PROCEDURE — 93000 ELECTROCARDIOGRAM COMPLETE: CPT

## 2022-06-17 PROCEDURE — 99214 OFFICE O/P EST MOD 30 MIN: CPT | Mod: 25

## 2022-06-17 RX ORDER — LOSARTAN POTASSIUM 25 MG/1
25 TABLET, FILM COATED ORAL
Qty: 30 | Refills: 0 | Status: DISCONTINUED | COMMUNITY
Start: 2021-01-19 | End: 2022-06-17

## 2022-06-20 PROBLEM — E78.1 HYPERTRIGLYCERIDEMIA: Status: ACTIVE | Noted: 2018-02-09

## 2022-06-20 PROBLEM — R79.89 ELEVATED SERUM CREATININE: Status: ACTIVE | Noted: 2019-10-09

## 2022-06-20 PROBLEM — E55.9 VITAMIN D INSUFFICIENCY: Status: ACTIVE | Noted: 2018-02-09

## 2022-06-20 NOTE — HISTORY OF PRESENT ILLNESS
[de-identified] : Here today for follow-up of hypertension\par \par He had previously moved to [place] but states he and his wife decided to come back to [place]\par \par He states he ran out of his blood pressure medications 1 week ago.\par \par Reports he had actually stopped taking losartan a long time ago.\par \par He denies any complaints today

## 2022-06-20 NOTE — PHYSICAL EXAM
[No Acute Distress] : no acute distress [Well Developed] : well developed [Well-Appearing] : well-appearing [Normal Voice/Communication] : normal voice/communication [Normal Sclera/Conjunctiva] : normal sclera/conjunctiva [PERRL] : pupils equal round and reactive to light [Normal Oropharynx] : the oropharynx was normal [No JVD] : no jugular venous distention [Normal] : normal rate, regular rhythm, normal S1 and S2 and no murmur heard [No Edema] : there was no peripheral edema [No Rash] : no rash [Normal Affect] : the affect was normal [Alert and Oriented x3] : oriented to person, place, and time [Normal Mood] : the mood was normal [Normal Insight/Judgement] : insight and judgment were intact [No Lymphadenopathy] : no lymphadenopathy [Supple] : supple [Thyroid Normal, No Nodules] : the thyroid was normal and there were no nodules present [Soft] : abdomen soft [Non Tender] : non-tender [Non-distended] : non-distended [No Masses] : no abdominal mass palpated [No HSM] : no HSM [Normal Bowel Sounds] : normal bowel sounds [No Spinal Tenderness] : no spinal tenderness [No Focal Deficits] : no focal deficits [de-identified] : Obese

## 2022-06-20 NOTE — ASSESSMENT
[FreeTextEntry1] : \par Multiple sclerosis:\par -followed by neurology-Dr Jean\par -on Vumerity \par -he remains asymptomatic\par \par Uncontrolled HTN:\par -BP not at goal today but he ran out of blood pressure medication\par -I will refill amlodipine 10mg PO daily and HCTZ 25mg Po daily\par -He was previously on losartan but has not taken this in many months so we will hold off for now\par -I advised low fat/low cholesterol diet, low salt diet, and weight loss\par -Check fasting labs\par -Follow-up in 6 weeks for BP check\par \par Mildly elevated creatinine\par -Check labs\par \par History of abnormal EKG:\par -He was previously referred to cardiologist but he never went\par -He is asymptomatic from the cardiac standpoint\par -EKG today NSR at 72 with no ST abnormalities\par \par Obesity:\par -BMI 33\par -low fat/low chol diet and low carbohydrate diet advised\par -weight loss advised\par -Fasting labs ordered\par \par Hypertriglyceridemia:\par -Fasting labs ordered\par \par Vitamin D insufficiency:\par -Check vitamin D 25-OH\par \par History of elevated LFTS:\par -Check labs\par -Hepatitis B and C serologies were negative\par \par \par \par HCM:\par \par CPE 2021\par \par EKG 2022\par \par Depression screenin2022 PHQ 2 score 0\par \par Flu shot: He did not get this past flu season\par \par Tdap:  2016\par \par Covid vaccine (Pfizer x 3)\par \par HIV testing offered: he declined testing 2022\par \par Hepatitis B: immune 2020\par \par \par F/U 6 weeks for BP check.  Fasting labs ordered and drawn in office today

## 2022-06-20 NOTE — HEALTH RISK ASSESSMENT
[0] : 2) Feeling down, depressed, or hopeless: Not at all (0) [PHQ-2 Negative - No further assessment needed] : PHQ-2 Negative - No further assessment needed [[id number]] : 0

## 2022-06-22 LAB
25(OH)D3 SERPL-MCNC: 47.7 NG/ML
ALBUMIN SERPL ELPH-MCNC: 5.3 G/DL
ALP BLD-CCNC: 79 U/L
ALT SERPL-CCNC: 50 U/L
ANION GAP SERPL CALC-SCNC: 13 MMOL/L
APPEARANCE: CLEAR
AST SERPL-CCNC: 30 U/L
BACTERIA: NEGATIVE
BASOPHILS # BLD AUTO: 0.03 K/UL
BASOPHILS NFR BLD AUTO: 0.4 %
BILIRUB SERPL-MCNC: 0.5 MG/DL
BILIRUBIN URINE: NEGATIVE
BLOOD URINE: NEGATIVE
BUN SERPL-MCNC: 15 MG/DL
CALCIUM SERPL-MCNC: 10 MG/DL
CHLORIDE SERPL-SCNC: 99 MMOL/L
CHOLEST SERPL-MCNC: 172 MG/DL
CO2 SERPL-SCNC: 27 MMOL/L
COLOR: YELLOW
CREAT SERPL-MCNC: 1.07 MG/DL
EGFR: 93 ML/MIN/1.73M2
EOSINOPHIL # BLD AUTO: 0.1 K/UL
EOSINOPHIL NFR BLD AUTO: 1.5 %
ESTIMATED AVERAGE GLUCOSE: 131 MG/DL
GLUCOSE QUALITATIVE U: NEGATIVE
GLUCOSE SERPL-MCNC: 107 MG/DL
HBA1C MFR BLD HPLC: 6.2 %
HCT VFR BLD CALC: 45.9 %
HDLC SERPL-MCNC: 52 MG/DL
HGB BLD-MCNC: 14.5 G/DL
HYALINE CASTS: 0 /LPF
IMM GRANULOCYTES NFR BLD AUTO: 0.3 %
KETONES URINE: NEGATIVE
LDLC SERPL CALC-MCNC: 95 MG/DL
LEUKOCYTE ESTERASE URINE: NEGATIVE
LYMPHOCYTES # BLD AUTO: 1.33 K/UL
LYMPHOCYTES NFR BLD AUTO: 19.7 %
MAN DIFF?: NORMAL
MCHC RBC-ENTMCNC: 24.5 PG
MCHC RBC-ENTMCNC: 31.6 GM/DL
MCV RBC AUTO: 77.7 FL
MICROSCOPIC-UA: NORMAL
MONOCYTES # BLD AUTO: 0.59 K/UL
MONOCYTES NFR BLD AUTO: 8.7 %
NEUTROPHILS # BLD AUTO: 4.69 K/UL
NEUTROPHILS NFR BLD AUTO: 69.4 %
NITRITE URINE: NEGATIVE
NONHDLC SERPL-MCNC: 119 MG/DL
PH URINE: 6.5
PLATELET # BLD AUTO: 273 K/UL
POTASSIUM SERPL-SCNC: 4.2 MMOL/L
PROT SERPL-MCNC: 7.3 G/DL
PROTEIN URINE: NEGATIVE
RBC # BLD: 5.91 M/UL
RBC # FLD: 17.5 %
RED BLOOD CELLS URINE: 1 /HPF
SODIUM SERPL-SCNC: 139 MMOL/L
SPECIFIC GRAVITY URINE: 1.02
SQUAMOUS EPITHELIAL CELLS: 0 /HPF
T4 FREE SERPL-MCNC: 1.3 NG/DL
TRIGL SERPL-MCNC: 120 MG/DL
TSH SERPL-ACNC: 2.24 UIU/ML
UROBILINOGEN URINE: NORMAL
WBC # FLD AUTO: 6.76 K/UL
WHITE BLOOD CELLS URINE: 0 /HPF

## 2022-06-23 ENCOUNTER — FORM ENCOUNTER (OUTPATIENT)
Age: 32
End: 2022-06-23

## 2022-07-11 ENCOUNTER — FORM ENCOUNTER (OUTPATIENT)
Age: 32
End: 2022-07-11

## 2022-07-13 ENCOUNTER — NON-APPOINTMENT (OUTPATIENT)
Age: 32
End: 2022-07-13

## 2022-07-17 ENCOUNTER — NON-APPOINTMENT (OUTPATIENT)
Age: 32
End: 2022-07-17

## 2022-08-09 ENCOUNTER — APPOINTMENT (OUTPATIENT)
Dept: INTERNAL MEDICINE | Facility: CLINIC | Age: 32
End: 2022-08-09

## 2022-08-09 VITALS
BODY MASS INDEX: 33.11 KG/M2 | DIASTOLIC BLOOD PRESSURE: 80 MMHG | WEIGHT: 206 LBS | TEMPERATURE: 97.9 F | HEART RATE: 90 BPM | SYSTOLIC BLOOD PRESSURE: 122 MMHG | OXYGEN SATURATION: 99 % | HEIGHT: 66 IN | RESPIRATION RATE: 16 BRPM

## 2022-08-09 DIAGNOSIS — D13.5 BENIGN NEOPLASM OF EXTRAHEPATIC BILE DUCTS: ICD-10-CM

## 2022-08-09 DIAGNOSIS — Z00.00 ENCOUNTER FOR GENERAL ADULT MEDICAL EXAMINATION W/OUT ABNORMAL FINDINGS: ICD-10-CM

## 2022-08-09 PROCEDURE — 99214 OFFICE O/P EST MOD 30 MIN: CPT | Mod: 25

## 2022-08-09 PROCEDURE — 36415 COLL VENOUS BLD VENIPUNCTURE: CPT

## 2022-08-09 NOTE — PHYSICAL EXAM
[No Acute Distress] : no acute distress [Well-Appearing] : well-appearing [Normal Voice/Communication] : normal voice/communication [Normal Sclera/Conjunctiva] : normal sclera/conjunctiva [PERRL] : pupils equal round and reactive to light [Normal Oropharynx] : the oropharynx was normal [Normal] : normal rate, regular rhythm, normal S1 and S2 and no murmur heard [Soft] : abdomen soft [Non Tender] : non-tender [Non-distended] : non-distended [No Masses] : no abdominal mass palpated [No HSM] : no HSM [Normal Bowel Sounds] : normal bowel sounds [No Joint Swelling] : no joint swelling [No Focal Deficits] : no focal deficits [Normal Affect] : the affect was normal [Alert and Oriented x3] : oriented to person, place, and time [Normal Mood] : the mood was normal [Normal Insight/Judgement] : insight and judgment were intact [de-identified] : Bilateral lower extremity edema right greater than left. [de-identified] : He has bilateral lower extremity erythema, warmth, tenderness right leg greater than left leg.  There is no open skin lesions.  There is no ulcerations or vesicles.

## 2022-08-09 NOTE — REVIEW OF SYSTEMS
[Lower Ext Edema] : lower extremity edema [Negative] : Neurological [Fever] : no fever [Chills] : no chills [Fatigue] : no fatigue [Chest Pain] : no chest pain [Palpitations] : no palpitations [Shortness Of Breath] : no shortness of breath [Wheezing] : no wheezing [Cough] : no cough [Dyspnea on Exertion] : not dyspnea on exertion [Abdominal Pain] : no abdominal pain [Nausea] : no nausea [Diarrhea] : no diarrhea [Vomiting] : no vomiting [Joint Pain] : no joint pain [de-identified] : See HPI

## 2022-08-09 NOTE — PHYSICAL EXAM
[No Acute Distress] : no acute distress [Well-Appearing] : well-appearing [Normal Voice/Communication] : normal voice/communication [Normal Sclera/Conjunctiva] : normal sclera/conjunctiva [PERRL] : pupils equal round and reactive to light [Normal Oropharynx] : the oropharynx was normal [Normal] : normal rate, regular rhythm, normal S1 and S2 and no murmur heard [Soft] : abdomen soft [Non Tender] : non-tender [Non-distended] : non-distended [No Masses] : no abdominal mass palpated [No HSM] : no HSM [Normal Bowel Sounds] : normal bowel sounds [No Joint Swelling] : no joint swelling [No Focal Deficits] : no focal deficits [Normal Affect] : the affect was normal [Alert and Oriented x3] : oriented to person, place, and time [Normal Mood] : the mood was normal [Normal Insight/Judgement] : insight and judgment were intact [de-identified] : Bilateral lower extremity edema right greater than left. [de-identified] : He has bilateral lower extremity erythema, warmth, tenderness right leg greater than left leg.  There is no open skin lesions.  There is no ulcerations or vesicles.

## 2022-08-09 NOTE — REVIEW OF SYSTEMS
[Lower Ext Edema] : lower extremity edema [Negative] : Neurological [Fever] : no fever [Chills] : no chills [Fatigue] : no fatigue [Chest Pain] : no chest pain [Palpitations] : no palpitations [Shortness Of Breath] : no shortness of breath [Wheezing] : no wheezing [Cough] : no cough [Dyspnea on Exertion] : not dyspnea on exertion [Abdominal Pain] : no abdominal pain [Nausea] : no nausea [Diarrhea] : no diarrhea [Vomiting] : no vomiting [Joint Pain] : no joint pain [de-identified] : See HPI

## 2022-08-09 NOTE — ASSESSMENT
[FreeTextEntry1] : Bilateral lower extremity edema/pain and bilateral lower extremity cellulitis\par -I have advised the switch antibiotic to Augmentin 875 mg twice daily x7 days\par -I have advised to get bilateral lower extremity venous Dopplers soon as possible\par -I have advised he adhere to a low-salt diet\par -If his symptoms worsen he is to go to the emergency room\par -Check labs today\par -Of note he is on amlodipine which can cause lower extremity edema but he reports he has been on this in the past with no issues.  I also would not expect erythema/cellulitis with amlodipine\par \par Multiple sclerosis:\par -followed by neurology-Dr Jean\par -on Vumerity \par -he remains asymptomatic\par \par HTN:\par -BP at goal today\par -amlodipine 10mg PO daily and HCTZ 25mg Po daily\par -I advised low fat/low cholesterol diet, low salt diet, and weight loss\par \par Obesity/Pre-DM:\par -BMI 33\par -HgA1c 6.2 2022\par -low fat/low chol diet and low carbohydrate diet advised\par -weight loss advised\par \par Hypertriglyceridemia:\par -lipids at goal 2022\par \par Vitamin D insufficiency:\par -vitamin D 25-OH was normal 2022\par \par History of elevated LFTS:\par -ALT was 50 2022\par -Hepatitis B and C serologies were negative\par -abdominal US showed fatty liver and GB adenomyomatosis-f/u abdominal US in 1 year advised\par \par \par \par HCM:\par \par CPE: Advised previously\par \par EKG 2022\par \par Depression screenin2022 PHQ 2 score 0\par \par Flu shot: He did not get this past flu season\par \par Tdap:  2016\par \par Covid vaccine (Pfizer x 3)\par \par HIV testing offered: he declined testing 2022\par \par Hepatitis B: immune 2020\par \par \par F/U 1 week.  Labs drawn in office today

## 2022-08-10 ENCOUNTER — NON-APPOINTMENT (OUTPATIENT)
Age: 32
End: 2022-08-10

## 2022-08-10 LAB
ALBUMIN SERPL ELPH-MCNC: 5.1 G/DL
ALP BLD-CCNC: 79 U/L
ALT SERPL-CCNC: 28 U/L
ANION GAP SERPL CALC-SCNC: 16 MMOL/L
AST SERPL-CCNC: 20 U/L
BASOPHILS # BLD AUTO: 0.03 K/UL
BASOPHILS NFR BLD AUTO: 0.5 %
BILIRUB SERPL-MCNC: 0.4 MG/DL
BUN SERPL-MCNC: 23 MG/DL
CALCIUM SERPL-MCNC: 10 MG/DL
CHLORIDE SERPL-SCNC: 101 MMOL/L
CK SERPL-CCNC: 74 U/L
CO2 SERPL-SCNC: 25 MMOL/L
CREAT SERPL-MCNC: 1.23 MG/DL
CRP SERPL-MCNC: 19 MG/L
EGFR: 79 ML/MIN/1.73M2
EOSINOPHIL # BLD AUTO: 0.11 K/UL
EOSINOPHIL NFR BLD AUTO: 1.9 %
ERYTHROCYTE [SEDIMENTATION RATE] IN BLOOD BY WESTERGREN METHOD: 35 MM/HR
GLUCOSE SERPL-MCNC: 91 MG/DL
HCT VFR BLD CALC: 46.5 %
HGB BLD-MCNC: 14.2 G/DL
IMM GRANULOCYTES NFR BLD AUTO: 0.4 %
LYMPHOCYTES # BLD AUTO: 1.02 K/UL
LYMPHOCYTES NFR BLD AUTO: 18 %
MAN DIFF?: NORMAL
MCHC RBC-ENTMCNC: 24 PG
MCHC RBC-ENTMCNC: 30.5 GM/DL
MCV RBC AUTO: 78.5 FL
MONOCYTES # BLD AUTO: 0.57 K/UL
MONOCYTES NFR BLD AUTO: 10 %
NEUTROPHILS # BLD AUTO: 3.93 K/UL
NEUTROPHILS NFR BLD AUTO: 69.2 %
PLATELET # BLD AUTO: 279 K/UL
POTASSIUM SERPL-SCNC: 4 MMOL/L
PROT SERPL-MCNC: 7.2 G/DL
RBC # BLD: 5.92 M/UL
RBC # FLD: 16.3 %
SODIUM SERPL-SCNC: 142 MMOL/L
WBC # FLD AUTO: 5.68 K/UL

## 2022-08-11 ENCOUNTER — NON-APPOINTMENT (OUTPATIENT)
Age: 32
End: 2022-08-11

## 2022-08-11 ENCOUNTER — APPOINTMENT (OUTPATIENT)
Dept: INTERNAL MEDICINE | Facility: CLINIC | Age: 32
End: 2022-08-11

## 2022-08-11 VITALS
BODY MASS INDEX: 33.75 KG/M2 | SYSTOLIC BLOOD PRESSURE: 129 MMHG | HEIGHT: 66 IN | TEMPERATURE: 97.9 F | DIASTOLIC BLOOD PRESSURE: 87 MMHG | HEART RATE: 77 BPM | WEIGHT: 210 LBS | OXYGEN SATURATION: 98 %

## 2022-08-11 PROCEDURE — 99213 OFFICE O/P EST LOW 20 MIN: CPT

## 2022-08-11 NOTE — REVIEW OF SYSTEMS
[Fever] : no fever [Chills] : no chills [Chest Pain] : no chest pain [Palpitations] : no palpitations [Shortness Of Breath] : no shortness of breath [Wheezing] : no wheezing [Cough] : no cough [Dyspnea on Exertion] : not dyspnea on exertion [Abdominal Pain] : no abdominal pain [Nausea] : no nausea [Diarrhea] : no diarrhea [Vomiting] : no vomiting [Joint Pain] : no joint pain [de-identified] : See HPI

## 2022-08-11 NOTE — PHYSICAL EXAM
[de-identified] : Bilateral lower extremity edema right greater than left-slightly improved from last visit [de-identified] : He has bilateral lower extremity erythema, warmth, tenderness right leg greater than left leg.  There is no open skin lesions.  There is no ulcerations or vesicles.  The erythema on bilateral lower extremities appears less pronounced than last visit

## 2022-08-11 NOTE — HISTORY OF PRESENT ILLNESS
[de-identified] : Tristan is here for follow-up of lower extremity cellulitis\par \par He states last night he thought his ankles looked more puffy and swollen but states today he feels legs look better\par \par He states legs and ankles are less swollen and less red than yesterday\par \par He continues to take antibiotic\par \par No fevers or chills reported

## 2022-08-11 NOTE — ASSESSMENT
[FreeTextEntry1] : Bilateral lower extremity cellulitis\par -Overall edema and erythema has improved slightly compared to last visit\par -No fever/chills reported\par -He should continue taking Augmentin 875 mg twice daily x7 days\par -bilateral lower extremity venous dopplers were negative for DVT\par -Recent labs revealed mildly elevated ESR and CRP but otherwise normal\par -I have advised he adhere to a low-salt diet\par -I have advised that he elevate legs while at home\par -If his symptoms worsen he is to go to the emergency room\par -Check labs today\par -If edema in lower extremity persists may consider stopping amlodipine and changing to alternate blood pressure medication\par -I did discuss with him that Vumerity can sometimes cause erythema so if this were to persist he may need to discuss with neurologist\par \par Multiple sclerosis:\par -followed by neurology-Dr Jean\par -on Vumerity \par -he remains asymptomatic\par \par HTN:\par -BP near goal today\par -amlodipine 10mg PO daily and HCTZ 25mg Po daily\par \par Obesity/Pre-DM:\par -HgA1c 6.2 2022\par \par Hypertriglyceridemia:\par -lipids at goal 2022\par \par Vitamin D insufficiency:\par -vitamin D 25-OH was normal 2022\par \par History of elevated LFTS:\par -AST/ALT normal 2022\par -Hepatitis B and C serologies were negative\par -abdominal US showed fatty liver and GB adenomyomatosis-f/u abdominal US in 1 year advised\par \par \par \par HCM:\par \par CPE: Advised previously\par \par EKG 2022\par \par Depression screenin2022 PHQ 2 score 0\par \par Flu shot: He did not get this past flu season\par \par Tdap:  2016\par \par Covid vaccine (Pfizer x 3)\par \par HIV testing offered: he declined testing 2022\par \par Hepatitis B: immune 2020\par \par \par F/U 1 week.  Has appointment scheduled already.  Letter written for work today

## 2022-08-15 ENCOUNTER — NON-APPOINTMENT (OUTPATIENT)
Age: 32
End: 2022-08-15

## 2022-08-19 ENCOUNTER — APPOINTMENT (OUTPATIENT)
Dept: INTERNAL MEDICINE | Facility: CLINIC | Age: 32
End: 2022-08-19

## 2022-08-19 VITALS
DIASTOLIC BLOOD PRESSURE: 90 MMHG | HEIGHT: 66 IN | TEMPERATURE: 98.1 F | RESPIRATION RATE: 16 BRPM | SYSTOLIC BLOOD PRESSURE: 140 MMHG | BODY MASS INDEX: 33.27 KG/M2 | WEIGHT: 207 LBS | HEART RATE: 70 BPM | OXYGEN SATURATION: 99 %

## 2022-08-19 DIAGNOSIS — L03.119 CELLULITIS OF UNSPECIFIED PART OF LIMB: ICD-10-CM

## 2022-08-19 DIAGNOSIS — R60.0 LOCALIZED EDEMA: ICD-10-CM

## 2022-08-19 PROCEDURE — 99213 OFFICE O/P EST LOW 20 MIN: CPT

## 2022-08-19 RX ORDER — AMLODIPINE BESYLATE 10 MG/1
10 TABLET ORAL
Qty: 90 | Refills: 1 | Status: DISCONTINUED | COMMUNITY
Start: 2019-09-30 | End: 2022-08-19

## 2022-08-19 RX ORDER — CEPHALEXIN 500 MG/1
500 CAPSULE ORAL
Qty: 21 | Refills: 0 | Status: DISCONTINUED | COMMUNITY
Start: 2022-08-03 | End: 2022-08-19

## 2022-08-19 NOTE — REVIEW OF SYSTEMS
[Lower Ext Edema] : lower extremity edema [Fever] : no fever [Chills] : no chills [Chest Pain] : no chest pain [Palpitations] : no palpitations [Shortness Of Breath] : no shortness of breath [Wheezing] : no wheezing [Cough] : no cough [Dyspnea on Exertion] : not dyspnea on exertion [Abdominal Pain] : no abdominal pain [Nausea] : no nausea [Diarrhea] : no diarrhea [Vomiting] : no vomiting [Joint Pain] : no joint pain [FreeTextEntry5] : See HPI [de-identified] : See HPI

## 2022-08-19 NOTE — PHYSICAL EXAM
[No Acute Distress] : no acute distress [Well-Appearing] : well-appearing [Normal Voice/Communication] : normal voice/communication [Normal Sclera/Conjunctiva] : normal sclera/conjunctiva [PERRL] : pupils equal round and reactive to light [No JVD] : no jugular venous distention [Normal] : normal rate, regular rhythm, normal S1 and S2 and no murmur heard [Normal Affect] : the affect was normal [Alert and Oriented x3] : oriented to person, place, and time [Normal Mood] : the mood was normal [Normal Insight/Judgement] : insight and judgment were intact [Normal Oropharynx] : the oropharynx was normal [de-identified] : Left leg with no erythema or edema.  Right leg with mild edema.  Very faint erythema which appears to be resolving.  No calf tenderness

## 2022-08-19 NOTE — ASSESSMENT
[FreeTextEntry1] : Bilateral lower extremity cellulitis and edema\par -Overall symptoms have significantly improved\par -He finished Augmentin\par -He still has mild right lower extremity edema which seems to be worse at night.  Amlodipine may be contributing\par -At this point I will DC his amlodipine and change to losartan\par -Previous bilateral lower extremity venous dopplers were negative for DVT\par -I did discuss with him previously that Vumerity can sometimes cause erythema so if this were to persist he may need to discuss with neurologist\par \par Multiple sclerosis:\par -followed by neurology-Dr Jean\par -on Vumerity \par -he remains asymptomatic\par \par HTN:\par -BP mildly elevated today\par -Given persistent lower extremity edema I have advised that he stop taking his amlodipine\par -I will start losartan 50 mg once daily and he should continue HCTZ 25 mg daily\par -He should check BMP in 2 weeks lab order given\par -Follow-up in 6 weeks for BP check\par \par Obesity/Pre-DM:\par -HgA1c 6.2 2022\par \par Hypertriglyceridemia:\par -lipids at goal 2022\par \par Vitamin D insufficiency:\par -vitamin D 25-OH was normal 2022\par \par History of elevated LFTS:\par -AST/ALT normal 2022\par -Hepatitis B and C serologies were negative\par -abdominal US showed fatty liver and GB adenomyomatosis-f/u abdominal US in 1 year advised\par \par \par \par HCM:\par \par CPE: Advised previously\par \par EKG 2022\par \par Depression screenin2022 PHQ 2 score 0\par \par Flu shot: He did not get this past flu season\par \par Tdap:  2016\par \par Covid vaccine (Pf[employer] x 3)\par \par HIV testing offered: he declined testing 2022\par \par Hepatitis B: immune 2020\par \par \par F/U 6 weeks for BP check.  BMP lab order given which she will have done at the lab in 2 weeks.  He is to notify office if symptoms persist or worsen

## 2022-08-19 NOTE — HISTORY OF PRESENT ILLNESS
[de-identified] : For follow-up of bilateral lower extremity cellulitis and bilateral lower extremity swelling\par \par He states symptoms have significantly improved.  He states his left leg is no longer swollen and no longer red.  He states his right leg is still a little bit swollen but much improved.  He states redness has improved significantly.  He states the swelling in his right leg seems to be worse at night.  He wonders if it could be the amlodipine that is causing his leg swelling\par \par He states he otherwise feels fine and denies any new complaints

## 2022-10-02 ENCOUNTER — FORM ENCOUNTER (OUTPATIENT)
Age: 32
End: 2022-10-02

## 2022-10-07 ENCOUNTER — APPOINTMENT (OUTPATIENT)
Dept: INTERNAL MEDICINE | Facility: CLINIC | Age: 32
End: 2022-10-07

## 2022-11-16 ENCOUNTER — RX RENEWAL (OUTPATIENT)
Age: 32
End: 2022-11-16

## 2022-12-23 ENCOUNTER — RX RENEWAL (OUTPATIENT)
Age: 32
End: 2022-12-23

## 2023-01-10 ENCOUNTER — APPOINTMENT (OUTPATIENT)
Dept: INTERNAL MEDICINE | Facility: CLINIC | Age: 33
End: 2023-01-10
Payer: COMMERCIAL

## 2023-01-10 VITALS
HEART RATE: 75 BPM | TEMPERATURE: 98 F | RESPIRATION RATE: 16 BRPM | DIASTOLIC BLOOD PRESSURE: 88 MMHG | BODY MASS INDEX: 35.68 KG/M2 | WEIGHT: 222 LBS | HEIGHT: 66 IN | OXYGEN SATURATION: 99 % | SYSTOLIC BLOOD PRESSURE: 128 MMHG

## 2023-01-10 DIAGNOSIS — Z91.119 PATIENT'S NONCOMPLIANCE WITH DIETARY REGIMEN DUE TO UNSPECIFIED REASON: ICD-10-CM

## 2023-01-10 DIAGNOSIS — G35 MULTIPLE SCLEROSIS: ICD-10-CM

## 2023-01-10 DIAGNOSIS — R63.5 ABNORMAL WEIGHT GAIN: ICD-10-CM

## 2023-01-10 DIAGNOSIS — R90.89 OTHER ABNORMAL FINDINGS ON DIAGNOSTIC IMAGING OF CENTRAL NERVOUS SYSTEM: ICD-10-CM

## 2023-01-10 DIAGNOSIS — R79.89 OTHER SPECIFIED ABNORMAL FINDINGS OF BLOOD CHEMISTRY: ICD-10-CM

## 2023-01-10 DIAGNOSIS — Z86.69 PERSONAL HISTORY OF OTHER DISEASES OF THE NERVOUS SYSTEM AND SENSE ORGANS: ICD-10-CM

## 2023-01-10 DIAGNOSIS — Z23 ENCOUNTER FOR IMMUNIZATION: ICD-10-CM

## 2023-01-10 DIAGNOSIS — Z11.59 ENCOUNTER FOR SCREENING FOR OTHER VIRAL DISEASES: ICD-10-CM

## 2023-01-10 PROCEDURE — 99214 OFFICE O/P EST MOD 30 MIN: CPT | Mod: 25

## 2023-01-10 PROCEDURE — 90686 IIV4 VACC NO PRSV 0.5 ML IM: CPT

## 2023-01-10 PROCEDURE — 36415 COLL VENOUS BLD VENIPUNCTURE: CPT

## 2023-01-10 PROCEDURE — 96127 BRIEF EMOTIONAL/BEHAV ASSMT: CPT

## 2023-01-10 PROCEDURE — G0008: CPT

## 2023-01-10 RX ORDER — AMOXICILLIN AND CLAVULANATE POTASSIUM 875; 125 MG/1; MG/1
875-125 TABLET, COATED ORAL
Qty: 14 | Refills: 0 | Status: DISCONTINUED | COMMUNITY
Start: 2022-08-09 | End: 2023-01-10

## 2023-01-10 NOTE — REVIEW OF SYSTEMS
[Negative] : Psychiatric [Fever] : no fever [Chills] : no chills [Fatigue] : no fatigue [Chest Pain] : no chest pain [Palpitations] : no palpitations [Lower Ext Edema] : no lower extremity edema [Shortness Of Breath] : no shortness of breath [Wheezing] : no wheezing [Cough] : no cough [Dyspnea on Exertion] : not dyspnea on exertion [Abdominal Pain] : no abdominal pain [Nausea] : no nausea [Diarrhea] : no diarrhea [Vomiting] : no vomiting [Joint Pain] : no joint pain

## 2023-01-10 NOTE — HEALTH RISK ASSESSMENT
[0] : 2) Feeling down, depressed, or hopeless: Not at all (0) [PHQ-2 Negative - No further assessment needed] : PHQ-2 Negative - No further assessment needed [[id number]] : 0

## 2023-01-10 NOTE — HISTORY OF PRESENT ILLNESS
[de-identified] : Here today for follow up of HTN\par \par He states he has been eating a lot of fast food lately\par \par He states he feels well and denies any complaints

## 2023-01-10 NOTE — PHYSICAL EXAM
[No Acute Distress] : no acute distress [Well-Appearing] : well-appearing [Normal Voice/Communication] : normal voice/communication [Normal Sclera/Conjunctiva] : normal sclera/conjunctiva [PERRL] : pupils equal round and reactive to light [Normal Oropharynx] : the oropharynx was normal [No JVD] : no jugular venous distention [Normal] : normal rate, regular rhythm, normal S1 and S2 and no murmur heard [Normal Affect] : the affect was normal [Alert and Oriented x3] : oriented to person, place, and time [Normal Mood] : the mood was normal [Normal Insight/Judgement] : insight and judgment were intact [No Lymphadenopathy] : no lymphadenopathy [Supple] : supple [Thyroid Normal, No Nodules] : the thyroid was normal and there were no nodules present [No Edema] : there was no peripheral edema [Soft] : abdomen soft [Non-distended] : non-distended [No Masses] : no abdominal mass palpated [No HSM] : no HSM [Normal Bowel Sounds] : normal bowel sounds [No Rash] : no rash [No Focal Deficits] : no focal deficits [de-identified] : no calf tenderness

## 2023-01-15 LAB
ALBUMIN SERPL ELPH-MCNC: 5.1 G/DL
ALP BLD-CCNC: 77 U/L
ALT SERPL-CCNC: 41 U/L
ANION GAP SERPL CALC-SCNC: 12 MMOL/L
AST SERPL-CCNC: 23 U/L
BASOPHILS # BLD AUTO: 0.04 K/UL
BASOPHILS NFR BLD AUTO: 0.6 %
BILIRUB SERPL-MCNC: 0.5 MG/DL
BUN SERPL-MCNC: 21 MG/DL
CALCIUM SERPL-MCNC: 10.1 MG/DL
CHLORIDE SERPL-SCNC: 98 MMOL/L
CO2 SERPL-SCNC: 29 MMOL/L
CREAT SERPL-MCNC: 1.35 MG/DL
EGFR: 71 ML/MIN/1.73M2
EOSINOPHIL # BLD AUTO: 0.08 K/UL
EOSINOPHIL NFR BLD AUTO: 1.2 %
ESTIMATED AVERAGE GLUCOSE: 134 MG/DL
GLUCOSE SERPL-MCNC: 90 MG/DL
HBA1C MFR BLD HPLC: 6.3 %
HCT VFR BLD CALC: 48.7 %
HGB BLD-MCNC: 15.8 G/DL
IMM GRANULOCYTES NFR BLD AUTO: 0.3 %
LYMPHOCYTES # BLD AUTO: 1.28 K/UL
LYMPHOCYTES NFR BLD AUTO: 19.1 %
MAN DIFF?: NORMAL
MCHC RBC-ENTMCNC: 24.6 PG
MCHC RBC-ENTMCNC: 32.4 GM/DL
MCV RBC AUTO: 75.9 FL
MONOCYTES # BLD AUTO: 0.68 K/UL
MONOCYTES NFR BLD AUTO: 10.2 %
NEUTROPHILS # BLD AUTO: 4.59 K/UL
NEUTROPHILS NFR BLD AUTO: 68.6 %
PLATELET # BLD AUTO: 233 K/UL
POTASSIUM SERPL-SCNC: 4.3 MMOL/L
PROT SERPL-MCNC: 7 G/DL
RBC # BLD: 6.42 M/UL
RBC # FLD: 17.4 %
SODIUM SERPL-SCNC: 139 MMOL/L
WBC # FLD AUTO: 6.69 K/UL

## 2023-01-17 ENCOUNTER — NON-APPOINTMENT (OUTPATIENT)
Age: 33
End: 2023-01-17

## 2023-04-10 ENCOUNTER — FORM ENCOUNTER (OUTPATIENT)
Age: 33
End: 2023-04-10

## 2023-07-11 ENCOUNTER — APPOINTMENT (OUTPATIENT)
Dept: INTERNAL MEDICINE | Facility: CLINIC | Age: 33
End: 2023-07-11

## 2023-08-08 ENCOUNTER — RX RENEWAL (OUTPATIENT)
Age: 33
End: 2023-08-08

## 2023-09-05 ENCOUNTER — APPOINTMENT (OUTPATIENT)
Dept: INTERNAL MEDICINE | Facility: CLINIC | Age: 33
End: 2023-09-05

## 2023-10-18 ENCOUNTER — RX RENEWAL (OUTPATIENT)
Age: 33
End: 2023-10-18

## 2023-11-13 ENCOUNTER — RX RENEWAL (OUTPATIENT)
Age: 33
End: 2023-11-13

## 2023-12-07 ENCOUNTER — APPOINTMENT (OUTPATIENT)
Dept: INTERNAL MEDICINE | Facility: CLINIC | Age: 33
End: 2023-12-07

## 2023-12-12 ENCOUNTER — RX RENEWAL (OUTPATIENT)
Age: 33
End: 2023-12-12

## 2024-01-02 ENCOUNTER — APPOINTMENT (OUTPATIENT)
Dept: INTERNAL MEDICINE | Facility: CLINIC | Age: 34
End: 2024-01-02
Payer: COMMERCIAL

## 2024-01-02 VITALS
HEART RATE: 92 BPM | DIASTOLIC BLOOD PRESSURE: 89 MMHG | BODY MASS INDEX: 34.55 KG/M2 | HEIGHT: 66 IN | TEMPERATURE: 97.8 F | WEIGHT: 215 LBS | SYSTOLIC BLOOD PRESSURE: 148 MMHG | OXYGEN SATURATION: 97 % | RESPIRATION RATE: 16 BRPM

## 2024-01-02 DIAGNOSIS — E66.9 OBESITY, UNSPECIFIED: ICD-10-CM

## 2024-01-02 DIAGNOSIS — I10 ESSENTIAL (PRIMARY) HYPERTENSION: ICD-10-CM

## 2024-01-02 PROCEDURE — 99213 OFFICE O/P EST LOW 20 MIN: CPT

## 2024-01-02 RX ORDER — LOSARTAN POTASSIUM 50 MG/1
50 TABLET, FILM COATED ORAL
Qty: 90 | Refills: 0 | Status: ACTIVE | COMMUNITY
Start: 2022-08-19 | End: 1900-01-01

## 2024-01-03 NOTE — ASSESSMENT
[FreeTextEntry1] : Hypertension: Blood pressure is elevated today. He is on hydrochlorothiazide 25 mg once a day and losartan 50 mg once a day.  Patient claims that he took his last pill today. He is attributing his blood pressure to be high to whitecoat syndrome and he also had argument with somebody before coming to the office.  He denied any chest pain, no palpitation or dizziness. I renewed prescription.  He have a appointment for his annual wellness exam in 3 weeks.  I ordered labs to be done before the visit.  Multiple scelorosis: He is on Vumerity 231 mg capsule.

## 2024-01-03 NOTE — HISTORY OF PRESENT ILLNESS
[FreeTextEntry8] : Mr. JAYLA RUSSO is a 33 year male, presents today for f/u on HTN and get medication refilled. Pt. is overall feeling well. He stated he took his last pill today. He lost 7 pounds of weight since his last visit No change in bowel and bladder habits. No trouble sleeping at night.

## 2024-03-13 ENCOUNTER — APPOINTMENT (OUTPATIENT)
Dept: INTERNAL MEDICINE | Facility: CLINIC | Age: 34
End: 2024-03-13

## 2024-04-19 RX ORDER — HYDROCHLOROTHIAZIDE 25 MG/1
25 TABLET ORAL
Qty: 90 | Refills: 0 | Status: ACTIVE | COMMUNITY
Start: 2019-10-09 | End: 1900-01-01

## 2024-05-07 ENCOUNTER — APPOINTMENT (OUTPATIENT)
Dept: INTERNAL MEDICINE | Facility: CLINIC | Age: 34
End: 2024-05-07

## 2024-07-25 ENCOUNTER — NON-APPOINTMENT (OUTPATIENT)
Age: 34
End: 2024-07-25

## 2024-07-25 ENCOUNTER — APPOINTMENT (OUTPATIENT)
Dept: INTERNAL MEDICINE | Facility: CLINIC | Age: 34
End: 2024-07-25
Payer: COMMERCIAL

## 2024-07-25 VITALS
WEIGHT: 214 LBS | RESPIRATION RATE: 16 BRPM | BODY MASS INDEX: 34.39 KG/M2 | HEIGHT: 66 IN | TEMPERATURE: 98 F | SYSTOLIC BLOOD PRESSURE: 144 MMHG | OXYGEN SATURATION: 98 % | DIASTOLIC BLOOD PRESSURE: 100 MMHG | HEART RATE: 72 BPM

## 2024-07-25 DIAGNOSIS — I10 ESSENTIAL (PRIMARY) HYPERTENSION: ICD-10-CM

## 2024-07-25 DIAGNOSIS — Z00.00 ENCOUNTER FOR GENERAL ADULT MEDICAL EXAMINATION W/OUT ABNORMAL FINDINGS: ICD-10-CM

## 2024-07-25 DIAGNOSIS — Z13.6 ENCOUNTER FOR SCREENING FOR CARDIOVASCULAR DISORDERS: ICD-10-CM

## 2024-07-25 DIAGNOSIS — G35 MULTIPLE SCLEROSIS: ICD-10-CM

## 2024-07-25 DIAGNOSIS — E66.9 OBESITY, UNSPECIFIED: ICD-10-CM

## 2024-07-25 DIAGNOSIS — R73.03 PREDIABETES.: ICD-10-CM

## 2024-07-25 PROCEDURE — 99204 OFFICE O/P NEW MOD 45 MIN: CPT | Mod: 25

## 2024-07-25 PROCEDURE — 99395 PREV VISIT EST AGE 18-39: CPT

## 2024-07-25 PROCEDURE — G0447 BEHAVIOR COUNSEL OBESITY 15M: CPT | Mod: NC,59

## 2024-07-30 PROBLEM — R73.03 PREDIABETES: Status: ACTIVE | Noted: 2024-07-30

## 2024-07-30 LAB
ALBUMIN SERPL ELPH-MCNC: 4.8 G/DL
ALP BLD-CCNC: 80 U/L
ALT SERPL-CCNC: 27 U/L
ANION GAP SERPL CALC-SCNC: 15 MMOL/L
AST SERPL-CCNC: 21 U/L
BASOPHILS # BLD AUTO: 0.03 K/UL
BASOPHILS NFR BLD AUTO: 0.5 %
BILIRUB SERPL-MCNC: 0.5 MG/DL
BUN SERPL-MCNC: 19 MG/DL
CALCIUM SERPL-MCNC: 9.7 MG/DL
CHLORIDE SERPL-SCNC: 103 MMOL/L
CHOLEST SERPL-MCNC: 146 MG/DL
CO2 SERPL-SCNC: 26 MMOL/L
CREAT SERPL-MCNC: 1.14 MG/DL
EGFR: 85 ML/MIN/1.73M2
EOSINOPHIL # BLD AUTO: 0.12 K/UL
EOSINOPHIL NFR BLD AUTO: 2.1 %
ESTIMATED AVERAGE GLUCOSE: 126 MG/DL
GLUCOSE SERPL-MCNC: 91 MG/DL
HBA1C MFR BLD HPLC: 6 %
HCT VFR BLD CALC: 49.9 %
HDLC SERPL-MCNC: 40 MG/DL
HGB BLD-MCNC: 15.3 G/DL
IMM GRANULOCYTES NFR BLD AUTO: 0.4 %
LDLC SERPL CALC-MCNC: 89 MG/DL
LYMPHOCYTES # BLD AUTO: 1.26 K/UL
LYMPHOCYTES NFR BLD AUTO: 22.3 %
MAN DIFF?: NORMAL
MCHC RBC-ENTMCNC: 24.8 PG
MCHC RBC-ENTMCNC: 30.7 GM/DL
MCV RBC AUTO: 80.9 FL
MONOCYTES # BLD AUTO: 0.53 K/UL
MONOCYTES NFR BLD AUTO: 9.4 %
NEUTROPHILS # BLD AUTO: 3.69 K/UL
NEUTROPHILS NFR BLD AUTO: 65.3 %
NONHDLC SERPL-MCNC: 106 MG/DL
PLATELET # BLD AUTO: 217 K/UL
POTASSIUM SERPL-SCNC: 3.9 MMOL/L
PROT SERPL-MCNC: 6.6 G/DL
PSA FREE FLD-MCNC: 41 %
PSA FREE SERPL-MCNC: 0.28 NG/ML
PSA SERPL-MCNC: 0.68 NG/ML
RBC # BLD: 6.17 M/UL
RBC # FLD: 15.2 %
SODIUM SERPL-SCNC: 144 MMOL/L
TRIGL SERPL-MCNC: 89 MG/DL
TSH SERPL-ACNC: 1.76 UIU/ML
WBC # FLD AUTO: 5.65 K/UL

## 2024-07-30 NOTE — ASSESSMENT
[FreeTextEntry1] : , , Preventative: Counseled on health promotion and disease prevention. EKG and wellness labs done  Heart Screen:  EKG nsr  Multiple Sclerosis: saw neuro yesterday for hx of MS will be switching to a twice a year infusion therapy and stopping Vumerity  HTN: Losartan HCTZ  Pre-Diabetes: HbA1C % = 6.3-->6.0 Counseled patient to cut down on processed sugar and carbohydrates. Increase Aerobic exercise and resistance training.  Obesity: Counseled on diet, exercise and weight loss strategies.

## 2024-12-05 ENCOUNTER — RX RENEWAL (OUTPATIENT)
Age: 34
End: 2024-12-05

## 2024-12-26 ENCOUNTER — RX RENEWAL (OUTPATIENT)
Age: 34
End: 2024-12-26

## 2025-01-23 ENCOUNTER — APPOINTMENT (OUTPATIENT)
Dept: INTERNAL MEDICINE | Facility: CLINIC | Age: 35
End: 2025-01-23

## 2025-01-23 VITALS
OXYGEN SATURATION: 98 % | DIASTOLIC BLOOD PRESSURE: 100 MMHG | RESPIRATION RATE: 15 BRPM | SYSTOLIC BLOOD PRESSURE: 150 MMHG | WEIGHT: 216 LBS | HEIGHT: 66 IN | TEMPERATURE: 98.1 F | BODY MASS INDEX: 34.72 KG/M2 | HEART RATE: 67 BPM

## 2025-01-23 VITALS — SYSTOLIC BLOOD PRESSURE: 130 MMHG | DIASTOLIC BLOOD PRESSURE: 88 MMHG

## 2025-01-23 DIAGNOSIS — I10 ESSENTIAL (PRIMARY) HYPERTENSION: ICD-10-CM

## 2025-01-23 DIAGNOSIS — E66.9 OBESITY, UNSPECIFIED: ICD-10-CM

## 2025-01-23 DIAGNOSIS — R26.81 UNSTEADINESS ON FEET: ICD-10-CM

## 2025-01-23 DIAGNOSIS — G35 MULTIPLE SCLEROSIS: ICD-10-CM

## 2025-01-23 DIAGNOSIS — R73.03 PREDIABETES.: ICD-10-CM

## 2025-01-23 LAB — HBA1C MFR BLD HPLC: 6

## 2025-01-23 PROCEDURE — G2211 COMPLEX E/M VISIT ADD ON: CPT | Mod: NC

## 2025-01-23 PROCEDURE — 83036 HEMOGLOBIN GLYCOSYLATED A1C: CPT | Mod: QW

## 2025-01-23 PROCEDURE — 99214 OFFICE O/P EST MOD 30 MIN: CPT | Mod: 25

## 2025-01-23 PROCEDURE — 90471 IMMUNIZATION ADMIN: CPT

## 2025-01-23 PROCEDURE — 90656 IIV3 VACC NO PRSV 0.5 ML IM: CPT

## 2025-01-23 RX ORDER — OXYBUTYNIN CHLORIDE 10 MG/1
10 TABLET, EXTENDED RELEASE ORAL
Refills: 0 | Status: ACTIVE | COMMUNITY

## 2025-01-31 PROBLEM — R26.81 UNSTEADY GAIT: Status: ACTIVE | Noted: 2025-01-31

## 2025-06-13 ENCOUNTER — RX RENEWAL (OUTPATIENT)
Age: 35
End: 2025-06-13

## 2025-07-30 ENCOUNTER — NON-APPOINTMENT (OUTPATIENT)
Age: 35
End: 2025-07-30

## 2025-07-31 ENCOUNTER — APPOINTMENT (OUTPATIENT)
Dept: INTERNAL MEDICINE | Facility: CLINIC | Age: 35
End: 2025-07-31
Payer: COMMERCIAL

## 2025-07-31 VITALS
BODY MASS INDEX: 33.43 KG/M2 | HEART RATE: 88 BPM | RESPIRATION RATE: 16 BRPM | HEIGHT: 66 IN | SYSTOLIC BLOOD PRESSURE: 138 MMHG | OXYGEN SATURATION: 96 % | TEMPERATURE: 98 F | DIASTOLIC BLOOD PRESSURE: 93 MMHG | WEIGHT: 208 LBS

## 2025-07-31 DIAGNOSIS — R73.03 PREDIABETES.: ICD-10-CM

## 2025-07-31 DIAGNOSIS — I10 ESSENTIAL (PRIMARY) HYPERTENSION: ICD-10-CM

## 2025-07-31 DIAGNOSIS — Z13.6 ENCOUNTER FOR SCREENING FOR CARDIOVASCULAR DISORDERS: ICD-10-CM

## 2025-07-31 DIAGNOSIS — G35 MULTIPLE SCLEROSIS: ICD-10-CM

## 2025-07-31 DIAGNOSIS — R26.81 UNSTEADINESS ON FEET: ICD-10-CM

## 2025-07-31 DIAGNOSIS — Z00.00 ENCOUNTER FOR GENERAL ADULT MEDICAL EXAMINATION W/OUT ABNORMAL FINDINGS: ICD-10-CM

## 2025-07-31 DIAGNOSIS — K76.0 FATTY (CHANGE OF) LIVER, NOT ELSEWHERE CLASSIFIED: ICD-10-CM

## 2025-07-31 DIAGNOSIS — E66.9 OBESITY, UNSPECIFIED: ICD-10-CM

## 2025-07-31 PROCEDURE — 93000 ELECTROCARDIOGRAM COMPLETE: CPT

## 2025-07-31 PROCEDURE — G0447 BEHAVIOR COUNSEL OBESITY 15M: CPT | Mod: NC,59

## 2025-07-31 PROCEDURE — 99214 OFFICE O/P EST MOD 30 MIN: CPT | Mod: 25

## 2025-07-31 PROCEDURE — 99395 PREV VISIT EST AGE 18-39: CPT

## 2025-08-11 ENCOUNTER — TRANSCRIPTION ENCOUNTER (OUTPATIENT)
Age: 35
End: 2025-08-11

## 2025-08-12 LAB
ALBUMIN SERPL ELPH-MCNC: 4.8 G/DL
ALP BLD-CCNC: 102 U/L
ALT SERPL-CCNC: 43 U/L
ANION GAP SERPL CALC-SCNC: 17 MMOL/L
AST SERPL-CCNC: 51 U/L
BASOPHILS # BLD AUTO: 0.03 K/UL
BASOPHILS NFR BLD AUTO: 0.4 %
BILIRUB SERPL-MCNC: 0.4 MG/DL
BUN SERPL-MCNC: 16 MG/DL
CALCIUM SERPL-MCNC: 10 MG/DL
CHLORIDE SERPL-SCNC: 103 MMOL/L
CHOLEST SERPL-MCNC: 197 MG/DL
CO2 SERPL-SCNC: 22 MMOL/L
CREAT SERPL-MCNC: 1.12 MG/DL
EGFRCR SERPLBLD CKD-EPI 2021: 87 ML/MIN/1.73M2
EOSINOPHIL # BLD AUTO: 0.19 K/UL
EOSINOPHIL NFR BLD AUTO: 2.6 %
ESTIMATED AVERAGE GLUCOSE: 134 MG/DL
GLUCOSE SERPL-MCNC: 124 MG/DL
HBA1C MFR BLD HPLC: 6.3 %
HCT VFR BLD CALC: 51.7 %
HDLC SERPL-MCNC: 45 MG/DL
HGB BLD-MCNC: 16.1 G/DL
IMM GRANULOCYTES NFR BLD AUTO: 0.3 %
LDLC SERPL-MCNC: 129 MG/DL
LYMPHOCYTES # BLD AUTO: 1.23 K/UL
LYMPHOCYTES NFR BLD AUTO: 16.9 %
MAN DIFF?: NORMAL
MCHC RBC-ENTMCNC: 24.8 PG
MCHC RBC-ENTMCNC: 31.1 G/DL
MCV RBC AUTO: 79.7 FL
MONOCYTES # BLD AUTO: 0.59 K/UL
MONOCYTES NFR BLD AUTO: 8.1 %
NEUTROPHILS # BLD AUTO: 5.22 K/UL
NEUTROPHILS NFR BLD AUTO: 71.7 %
NONHDLC SERPL-MCNC: 152 MG/DL
PLATELET # BLD AUTO: 257 K/UL
POTASSIUM SERPL-SCNC: 3.8 MMOL/L
PROT SERPL-MCNC: 6.8 G/DL
PSA FREE FLD-MCNC: 35 %
PSA FREE SERPL-MCNC: 0.28 NG/ML
PSA SERPL-MCNC: 0.79 NG/ML
RBC # BLD: 6.49 M/UL
RBC # FLD: 17.2 %
SODIUM SERPL-SCNC: 143 MMOL/L
TRIGL SERPL-MCNC: 124 MG/DL
TSH SERPL-ACNC: 2.2 UIU/ML
WBC # FLD AUTO: 7.28 K/UL

## 2025-08-20 ENCOUNTER — RX RENEWAL (OUTPATIENT)
Age: 35
End: 2025-08-20

## 2025-08-28 ENCOUNTER — RX RENEWAL (OUTPATIENT)
Age: 35
End: 2025-08-28